# Patient Record
Sex: FEMALE | Race: WHITE | HISPANIC OR LATINO | Employment: UNEMPLOYED | URBAN - METROPOLITAN AREA
[De-identification: names, ages, dates, MRNs, and addresses within clinical notes are randomized per-mention and may not be internally consistent; named-entity substitution may affect disease eponyms.]

---

## 2022-10-13 ENCOUNTER — HOSPITAL ENCOUNTER (EMERGENCY)
Facility: HOSPITAL | Age: 52
Discharge: HOME | End: 2022-10-13
Attending: INTERNAL MEDICINE
Payer: MEDICAID

## 2022-10-13 VITALS
RESPIRATION RATE: 16 BRPM | OXYGEN SATURATION: 100 % | HEIGHT: 66 IN | SYSTOLIC BLOOD PRESSURE: 156 MMHG | WEIGHT: 163.13 LBS | BODY MASS INDEX: 26.22 KG/M2 | DIASTOLIC BLOOD PRESSURE: 80 MMHG | HEART RATE: 80 BPM | TEMPERATURE: 98 F

## 2022-10-13 DIAGNOSIS — M54.12 CERVICAL RADICULOPATHY: Primary | ICD-10-CM

## 2022-10-13 PROCEDURE — 63600175 PHARM REV CODE 636 W HCPCS: Performed by: PHYSICIAN ASSISTANT

## 2022-10-13 PROCEDURE — 96372 THER/PROPH/DIAG INJ SC/IM: CPT | Performed by: PHYSICIAN ASSISTANT

## 2022-10-13 PROCEDURE — 99284 EMERGENCY DEPT VISIT MOD MDM: CPT | Mod: 25

## 2022-10-13 RX ORDER — KETOROLAC TROMETHAMINE 30 MG/ML
30 INJECTION, SOLUTION INTRAMUSCULAR; INTRAVENOUS
Status: COMPLETED | OUTPATIENT
Start: 2022-10-13 | End: 2022-10-13

## 2022-10-13 RX ORDER — METHYLPREDNISOLONE 4 MG/1
TABLET ORAL
Qty: 21 TABLET | Refills: 0 | Status: SHIPPED | OUTPATIENT
Start: 2022-10-13 | End: 2023-01-04 | Stop reason: ALTCHOICE

## 2022-10-13 RX ORDER — METHYLPREDNISOLONE SOD SUCC 125 MG
125 VIAL (EA) INJECTION
Status: COMPLETED | OUTPATIENT
Start: 2022-10-13 | End: 2022-10-13

## 2022-10-13 RX ORDER — BACLOFEN 20 MG/1
20 TABLET ORAL 3 TIMES DAILY PRN
Qty: 15 TABLET | Refills: 0 | Status: SHIPPED | OUTPATIENT
Start: 2022-10-13 | End: 2022-12-12 | Stop reason: SDUPTHER

## 2022-10-13 RX ORDER — INDOMETHACIN 50 MG/1
50 CAPSULE ORAL
Qty: 15 CAPSULE | Refills: 0 | Status: SHIPPED | OUTPATIENT
Start: 2022-10-13 | End: 2022-12-12 | Stop reason: SDUPTHER

## 2022-10-13 RX ADMIN — KETOROLAC TROMETHAMINE 30 MG: 30 INJECTION, SOLUTION INTRAMUSCULAR; INTRAVENOUS at 10:10

## 2022-10-13 RX ADMIN — METHYLPREDNISOLONE SODIUM SUCCINATE 125 MG: 125 INJECTION, POWDER, FOR SOLUTION INTRAMUSCULAR; INTRAVENOUS at 10:10

## 2022-10-13 NOTE — ED PROVIDER NOTES
"Encounter Date: 10/13/2022       History     Chief Complaint   Patient presents with    Arm Pain     PT W CO RT ARM PAIN AND BURNING X 1 WK.  DENIES INJURY BUT PT HAS BEEN PAINTING.       50 yo F presents to ED c/o 1 day hx of pain, paresthesia, numbness & burning in RUE. Reports symptoms feel similar to hitting "funny bone." Patient denies any recent falls or other injury, but does report she has been painting frequently over the past week. Reports taking 400 mg of ibuprofen yesterday at time of symptom onset w/ no resolution of pain. Denies paralysis, decreased ROM, focal weakness, HA, vision changes, neck stiffness, AMS, confusion, vertigo, ataxia, abnormal balance, N/V. VSS on arrival w/ NAD.    Review of patient's allergies indicates:  No Known Allergies  No past medical history on file.  No past surgical history on file.  No family history on file.  Social History     Tobacco Use    Smoking status: Never    Smokeless tobacco: Never     Review of Systems   Constitutional:  Negative for chills, fatigue and fever.   HENT:  Negative for congestion, rhinorrhea and sore throat.    Eyes:  Negative for photophobia and visual disturbance.   Respiratory:  Negative for shortness of breath.    Cardiovascular:  Negative for chest pain and palpitations.   Gastrointestinal:  Negative for abdominal pain, nausea and vomiting.   Endocrine: Negative for polydipsia, polyphagia and polyuria.   Musculoskeletal:  Positive for arthralgias, myalgias and neck pain. Negative for back pain, gait problem, joint swelling and neck stiffness.   Skin:  Negative for color change, pallor, rash and wound.   Neurological:  Positive for numbness (RUE). Negative for dizziness, tremors, seizures, syncope, facial asymmetry, speech difficulty, weakness, light-headedness and headaches.   Hematological:  Does not bruise/bleed easily.   Psychiatric/Behavioral:  Negative for confusion.    All other systems reviewed and are negative.    Physical Exam "     Initial Vitals [10/13/22 0938]   BP Pulse Resp Temp SpO2   (!) 168/81 81 16 97.3 °F (36.3 °C) 100 %      MAP       --         Physical Exam    Nursing note and vitals reviewed.  Constitutional: She appears well-developed and well-nourished. She is not diaphoretic. No distress.   HENT:   Head: Normocephalic and atraumatic.   Mouth/Throat: Oropharynx is clear and moist.   Eyes: Conjunctivae and EOM are normal. Pupils are equal, round, and reactive to light.   Neck: Neck supple. No Brudzinski's sign and no Kernig's sign noted. Carotid bruit is not present. Normal carotid pulses present.   Positive spurling test on R side   Normal range of motion.   Full passive range of motion without pain.     Cardiovascular:  Normal rate, regular rhythm, normal heart sounds and intact distal pulses.     Exam reveals no gallop and no friction rub.       No murmur heard.  Pulmonary/Chest: Breath sounds normal. No respiratory distress. She has no wheezes. She has no rhonchi. She has no rales.   Abdominal: Abdomen is soft. Bowel sounds are normal. She exhibits no distension and no mass. There is no abdominal tenderness. There is no rebound and no guarding.   Musculoskeletal:         General: No tenderness or edema. Normal range of motion.      Cervical back: Full passive range of motion without pain, normal range of motion and neck supple. No rigidity. Muscular tenderness present. No spinous process tenderness. Normal range of motion.     Neurological: She is alert and oriented to person, place, and time. She has normal strength. She is not disoriented. She displays no tremor. No cranial nerve deficit or sensory deficit. She exhibits normal muscle tone. She displays a negative Romberg sign. She displays no seizure activity. Coordination and gait normal. GCS score is 15. GCS eye subscore is 4. GCS verbal subscore is 5. GCS motor subscore is 6.   Skin: Skin is warm and dry. Capillary refill takes less than 2 seconds. No rash noted. No  erythema. No pallor.   Psychiatric: She has a normal mood and affect. Thought content normal.       ED Course   Procedures  Labs Reviewed - No data to display       Imaging Results    None          Medications   ketorolac injection 30 mg (has no administration in time range)   methylPREDNISolone sodium succinate injection 125 mg (has no administration in time range)     Medical Decision Making:   Unremarkable physical exam w/ no deficits. Physical exam consistent w/ cervical radiculopathy. Will given IM toradol & solumedrol in ED & discharge w/ PO NSAID & steroids. Instructed to follow-up w/ PCP. ED precautions needed.                        Clinical Impression:   Final diagnoses:  [M54.12] Cervical radiculopathy (Primary)      ED Disposition Condition    Discharge Stable          ED Prescriptions       Medication Sig Dispense Start Date End Date Auth. Provider    indomethacin (INDOCIN) 50 MG capsule Take 1 capsule (50 mg total) by mouth 3 (three) times daily with meals. 15 capsule 10/13/2022 -- ASHU Bello    methylPREDNISolone (MEDROL DOSEPACK) 4 mg tablet Take all tablets as directed on packaging 21 tablet 10/13/2022 -- ASHU Bello    baclofen (LIORESAL) 20 MG tablet Take 1 tablet (20 mg total) by mouth 3 (three) times daily as needed (muscle spasm). 15 tablet 10/13/2022 10/13/2023 ASHU Bello          Follow-up Information       Follow up With Specialties Details Why Contact Info    PCP  In 1 week      [facility] Emergency Medicine  If symptoms worsen [address] [facility] [id number] [phone]             ASHU Bello  10/13/22 9202

## 2022-12-12 ENCOUNTER — HOSPITAL ENCOUNTER (EMERGENCY)
Facility: HOSPITAL | Age: 52
Discharge: HOME | End: 2022-12-12
Attending: FAMILY MEDICINE
Payer: MEDICAID

## 2022-12-12 VITALS
DIASTOLIC BLOOD PRESSURE: 83 MMHG | BODY MASS INDEX: 27.11 KG/M2 | TEMPERATURE: 98 F | HEART RATE: 87 BPM | RESPIRATION RATE: 18 BRPM | WEIGHT: 162.75 LBS | SYSTOLIC BLOOD PRESSURE: 135 MMHG | HEIGHT: 65 IN | OXYGEN SATURATION: 99 %

## 2022-12-12 DIAGNOSIS — G56.01 RIGHT CARPAL TUNNEL SYNDROME: Primary | ICD-10-CM

## 2022-12-12 PROCEDURE — 63600175 PHARM REV CODE 636 W HCPCS: Performed by: FAMILY MEDICINE

## 2022-12-12 PROCEDURE — 99284 EMERGENCY DEPT VISIT MOD MDM: CPT

## 2022-12-12 PROCEDURE — 96372 THER/PROPH/DIAG INJ SC/IM: CPT | Performed by: FAMILY MEDICINE

## 2022-12-12 RX ORDER — INDOMETHACIN 50 MG/1
50 CAPSULE ORAL
Qty: 15 CAPSULE | Refills: 0 | Status: SHIPPED | OUTPATIENT
Start: 2022-12-12 | End: 2022-12-20 | Stop reason: SINTOL

## 2022-12-12 RX ORDER — METHYLPREDNISOLONE 4 MG/1
TABLET ORAL
Qty: 1 EACH | Refills: 0 | Status: SHIPPED | OUTPATIENT
Start: 2022-12-12 | End: 2022-12-20 | Stop reason: ALTCHOICE

## 2022-12-12 RX ORDER — METHYLPREDNISOLONE SOD SUCC 125 MG
125 VIAL (EA) INJECTION
Status: COMPLETED | OUTPATIENT
Start: 2022-12-12 | End: 2022-12-12

## 2022-12-12 RX ORDER — KETOROLAC TROMETHAMINE 30 MG/ML
60 INJECTION, SOLUTION INTRAMUSCULAR; INTRAVENOUS
Status: COMPLETED | OUTPATIENT
Start: 2022-12-12 | End: 2022-12-12

## 2022-12-12 RX ORDER — BACLOFEN 20 MG/1
20 TABLET ORAL 3 TIMES DAILY PRN
Qty: 15 TABLET | Refills: 0 | Status: SHIPPED | OUTPATIENT
Start: 2022-12-12 | End: 2022-12-20 | Stop reason: SDUPTHER

## 2022-12-12 RX ORDER — METHYLPREDNISOLONE SOD SUCC 125 MG
125 VIAL (EA) INJECTION
Status: DISCONTINUED | OUTPATIENT
Start: 2022-12-12 | End: 2022-12-12

## 2022-12-12 RX ADMIN — KETOROLAC TROMETHAMINE 60 MG: 30 INJECTION, SOLUTION INTRAMUSCULAR; INTRAVENOUS at 04:12

## 2022-12-12 RX ADMIN — METHYLPREDNISOLONE SODIUM SUCCINATE 125 MG: 125 INJECTION, POWDER, FOR SOLUTION INTRAMUSCULAR; INTRAVENOUS at 04:12

## 2022-12-12 NOTE — ED PROVIDER NOTES
"Encounter Date: 12/12/2022       History     Chief Complaint   Patient presents with    Hand Pain     States has been painting "a lot" and is having right hand pain and burning.       52-year-old female presents emergency room complaints of right hand pain.  Symptoms began acutely this evening after painting.  Patient reports having similar symptoms happened to her before in the past.  Patient feels an electrical sensation mainly in the thumb, index, 3rd finger but also little bit in the 4th finger.  She denies any injury, just reports repetitive type movement.  Patient had the same thing happened a few months ago after painting.  Reports symptoms are severe, worse with movement, better with remaining still but still has lot of discomfort with remaining still.  Patient is right-handed.  Describes it as a shocking/burning sensation.    The history is provided by the patient.   Review of patient's allergies indicates:  No Known Allergies  History reviewed. No pertinent past medical history.  Past Surgical History:   Procedure Laterality Date    APPENDECTOMY       History reviewed. No pertinent family history.  Social History     Tobacco Use    Smoking status: Never    Smokeless tobacco: Never   Substance Use Topics    Alcohol use: Yes    Drug use: Not Currently     Review of Systems   Constitutional:  Negative for chills, fatigue and fever.   HENT:  Negative for ear pain, rhinorrhea and sore throat.    Eyes:  Negative for photophobia and pain.   Respiratory:  Negative for cough, shortness of breath and wheezing.    Cardiovascular:  Negative for chest pain.   Gastrointestinal:  Negative for abdominal pain, diarrhea, nausea and vomiting.   Genitourinary:  Negative for dysuria.   Neurological:  Negative for dizziness, weakness and headaches.   All other systems reviewed and are negative.    Physical Exam     Initial Vitals [12/12/22 0301]   BP Pulse Resp Temp SpO2   (!) 157/93 93 17 98.4 °F (36.9 °C) 99 %      MAP     "   --         Physical Exam    Nursing note and vitals reviewed.  Constitutional: She appears well-developed and well-nourished. No distress.   HENT:   Head: Normocephalic and atraumatic.   Eyes: Conjunctivae and EOM are normal. Pupils are equal, round, and reactive to light.   Neck: Neck supple.   Normal range of motion.  Cardiovascular:  Normal rate, regular rhythm, normal heart sounds and intact distal pulses.           Pulmonary/Chest: Breath sounds normal. No respiratory distress. She has no wheezes. She has no rhonchi. She has no rales.   Abdominal: Abdomen is soft. Bowel sounds are normal. She exhibits no distension. There is no abdominal tenderness. There is no rebound and no guarding.   Musculoskeletal:         General: Normal range of motion.      Cervical back: Normal range of motion and neck supple.      Comments: 2+ radial pulse in the right upper extremity.  Full range of motion of the right hand without restriction.  Positive Tinel sign at the wrist.     Neurological: She is alert and oriented to person, place, and time.   Skin: Skin is warm and dry. Capillary refill takes less than 2 seconds. No erythema.   Psychiatric: She has a normal mood and affect. Her behavior is normal. Judgment and thought content normal.       ED Course   Procedures  Labs Reviewed - No data to display       Imaging Results    None          Medications   ketorolac injection 60 mg (60 mg Intramuscular Given 12/12/22 0418)   methylPREDNISolone sodium succinate injection 125 mg (125 mg Intramuscular Given 12/12/22 0417)                              Clinical Impression:   Final diagnoses:  [G56.01] Right carpal tunnel syndrome (Primary)        ED Disposition Condition    Discharge Stable          ED Prescriptions       Medication Sig Dispense Start Date End Date Auth. Provider    methylPREDNISolone (MEDROL DOSEPACK) 4 mg tablet Take as directed on bharathi 1 each 12/12/2022 -- Dillon Dillon MD    indomethacin (INDOCIN) 50 MG  capsule Take 1 capsule (50 mg total) by mouth 3 (three) times daily with meals. 15 capsule 12/12/2022 -- Dillon Dillon MD    baclofen (LIORESAL) 20 MG tablet Take 1 tablet (20 mg total) by mouth 3 (three) times daily as needed (muscle spasm). 15 tablet 12/12/2022 12/12/2023 Dillon Dillon MD          Follow-up Information       Follow up With Specialties Details Why Contact Info    [facility] Emergency Medicine  As needed, If symptoms worsen [address] [facility] [id number] [phone]             Dillon Dillon MD  12/12/22 3844

## 2022-12-14 ENCOUNTER — HOSPITAL ENCOUNTER (EMERGENCY)
Facility: HOSPITAL | Age: 52
Discharge: HOME | End: 2022-12-14
Attending: STUDENT IN AN ORGANIZED HEALTH CARE EDUCATION/TRAINING PROGRAM
Payer: MEDICAID

## 2022-12-14 VITALS
RESPIRATION RATE: 18 BRPM | BODY MASS INDEX: 26.47 KG/M2 | DIASTOLIC BLOOD PRESSURE: 97 MMHG | WEIGHT: 164.69 LBS | SYSTOLIC BLOOD PRESSURE: 148 MMHG | OXYGEN SATURATION: 98 % | HEART RATE: 92 BPM | TEMPERATURE: 98 F | HEIGHT: 66 IN

## 2022-12-14 DIAGNOSIS — J18.9 COMMUNITY ACQUIRED PNEUMONIA, UNSPECIFIED LATERALITY: Primary | ICD-10-CM

## 2022-12-14 DIAGNOSIS — J18.9 PNEUMONIA OF RIGHT UPPER LOBE DUE TO INFECTIOUS ORGANISM: ICD-10-CM

## 2022-12-14 LAB
ABS NEUT CALC (OHS): 24.12 X10(3)/MCL (ref 2.1–9.2)
ALBUMIN SERPL-MCNC: 3.1 G/DL (ref 3.5–5)
ALBUMIN/GLOB SERPL: 0.8 RATIO (ref 1.1–2)
ALP SERPL-CCNC: 130 UNIT/L (ref 40–150)
ALT SERPL-CCNC: 57 UNIT/L (ref 0–55)
AST SERPL-CCNC: 45 UNIT/L (ref 5–34)
BILIRUBIN DIRECT+TOT PNL SERPL-MCNC: 0.5 MG/DL
BUN SERPL-MCNC: 12.5 MG/DL (ref 9.8–20.1)
CALCIUM SERPL-MCNC: 9.3 MG/DL (ref 8.4–10.2)
CHLORIDE SERPL-SCNC: 104 MMOL/L (ref 98–107)
CO2 SERPL-SCNC: 24 MMOL/L (ref 22–29)
CREAT SERPL-MCNC: 0.72 MG/DL (ref 0.55–1.02)
DOHLE BOD BLD QL SMEAR: SLIGHT
ERYTHROCYTE [DISTWIDTH] IN BLOOD BY AUTOMATED COUNT: 12.8 % (ref 11–14.5)
FLUAV AG UPPER RESP QL IA.RAPID: NOT DETECTED
FLUBV AG UPPER RESP QL IA.RAPID: NOT DETECTED
GFR SERPLBLD CREATININE-BSD FMLA CKD-EPI: >60 MLS/MIN/1.73/M2
GLOBULIN SER-MCNC: 4.1 GM/DL (ref 2.4–3.5)
GLUCOSE SERPL-MCNC: 195 MG/DL (ref 74–100)
HCT VFR BLD AUTO: 37.9 % (ref 37–47)
HGB BLD-MCNC: 12.8 GM/DL (ref 12–16)
IMM GRANULOCYTES # BLD AUTO: 1.74 X10(3)/MCL (ref 0–0.04)
IMM GRANULOCYTES NFR BLD AUTO: 6.5 %
LACTATE SERPL-SCNC: 1.2 MMOL/L (ref 0.5–2.2)
LYMPHOCYTES NFR BLD MANUAL: 1.61 X10(3)/MCL
LYMPHOCYTES NFR BLD MANUAL: 6 % (ref 13–40)
MCH RBC QN AUTO: 32.5 PG
MCHC RBC AUTO-ENTMCNC: 33.8 MG/DL (ref 33–36)
MCV RBC AUTO: 96.2 FL (ref 80–94)
METAMYELOCYTES NFR BLD MANUAL: 2 %
MONOCYTES NFR BLD MANUAL: 1.07 X10(3)/MCL (ref 0.1–1.3)
MONOCYTES NFR BLD MANUAL: 4 % (ref 2–11)
NEUTROPHILS NFR BLD MANUAL: 83 % (ref 47–80)
NEUTS BAND NFR BLD MANUAL: 5 % (ref 0–11)
NRBC BLD AUTO-RTO: 0 % (ref 0–1)
PLATELET # BLD AUTO: 263 X10(3)/MCL (ref 140–371)
PLATELET # BLD EST: NORMAL 10*3/UL
PMV BLD AUTO: 9.3 FL (ref 9.4–12.4)
POTASSIUM SERPL-SCNC: 3.2 MMOL/L (ref 3.5–5.1)
PROT SERPL-MCNC: 7.2 GM/DL (ref 6.4–8.3)
RBC # BLD AUTO: 3.94 X10(6)/MCL (ref 4.2–5.4)
RBC MORPH BLD: NORMAL
SARS-COV-2 RNA RESP QL NAA+PROBE: NOT DETECTED
SODIUM SERPL-SCNC: 138 MMOL/L (ref 136–145)
STREP A PCR (OHS): NOT DETECTED
WBC # SPEC AUTO: 26.8 X10(3)/MCL (ref 4.5–11.5)
WBC VACUOLES (OHS): SLIGHT

## 2022-12-14 PROCEDURE — 87651 STREP A DNA AMP PROBE: CPT | Performed by: PHYSICIAN ASSISTANT

## 2022-12-14 PROCEDURE — 0240U COVID/FLU A&B PCR: CPT | Performed by: PHYSICIAN ASSISTANT

## 2022-12-14 PROCEDURE — 85027 COMPLETE CBC AUTOMATED: CPT | Performed by: PHYSICIAN ASSISTANT

## 2022-12-14 PROCEDURE — 80053 COMPREHEN METABOLIC PANEL: CPT | Performed by: PHYSICIAN ASSISTANT

## 2022-12-14 PROCEDURE — 25000003 PHARM REV CODE 250: Performed by: PHYSICIAN ASSISTANT

## 2022-12-14 PROCEDURE — 87040 BLOOD CULTURE FOR BACTERIA: CPT | Performed by: PHYSICIAN ASSISTANT

## 2022-12-14 PROCEDURE — 63600175 PHARM REV CODE 636 W HCPCS: Performed by: PHYSICIAN ASSISTANT

## 2022-12-14 PROCEDURE — 36415 COLL VENOUS BLD VENIPUNCTURE: CPT | Performed by: PHYSICIAN ASSISTANT

## 2022-12-14 PROCEDURE — 96365 THER/PROPH/DIAG IV INF INIT: CPT

## 2022-12-14 PROCEDURE — 83605 ASSAY OF LACTIC ACID: CPT | Performed by: PHYSICIAN ASSISTANT

## 2022-12-14 PROCEDURE — 96367 TX/PROPH/DG ADDL SEQ IV INF: CPT

## 2022-12-14 PROCEDURE — 99284 EMERGENCY DEPT VISIT MOD MDM: CPT | Mod: 25

## 2022-12-14 RX ORDER — SODIUM CHLORIDE 9 MG/ML
1000 INJECTION, SOLUTION INTRAVENOUS
Status: COMPLETED | OUTPATIENT
Start: 2022-12-14 | End: 2022-12-14

## 2022-12-14 RX ORDER — LEVOFLOXACIN 500 MG/1
500 TABLET, FILM COATED ORAL DAILY
Qty: 7 TABLET | Refills: 0 | Status: SHIPPED | OUTPATIENT
Start: 2022-12-14 | End: 2023-01-04 | Stop reason: ALTCHOICE

## 2022-12-14 RX ORDER — ACETAMINOPHEN 500 MG
1000 TABLET ORAL
Status: COMPLETED | OUTPATIENT
Start: 2022-12-14 | End: 2022-12-14

## 2022-12-14 RX ORDER — PROMETHAZINE HYDROCHLORIDE AND DEXTROMETHORPHAN HYDROBROMIDE 6.25; 15 MG/5ML; MG/5ML
5 SYRUP ORAL EVERY 6 HOURS PRN
Qty: 120 ML | Refills: 0 | Status: SHIPPED | OUTPATIENT
Start: 2022-12-14 | End: 2023-01-04 | Stop reason: ALTCHOICE

## 2022-12-14 RX ADMIN — SODIUM CHLORIDE 1 G: 900 INJECTION INTRAVENOUS at 07:12

## 2022-12-14 RX ADMIN — SODIUM CHLORIDE 1000 ML: 9 INJECTION, SOLUTION INTRAVENOUS at 07:12

## 2022-12-14 RX ADMIN — ACETAMINOPHEN 1000 MG: 500 TABLET ORAL at 07:12

## 2022-12-14 RX ADMIN — AZITHROMYCIN MONOHYDRATE 500 MG: 500 INJECTION, POWDER, LYOPHILIZED, FOR SOLUTION INTRAVENOUS at 08:12

## 2022-12-14 NOTE — ED PROVIDER NOTES
Encounter Date: 12/14/2022     History     Chief Complaint   Patient presents with    Cough    Chest Congestion     Cough and congestion x 3 days with body aches, fever at home, Tylenol 3 hrs pta, pt is currently on steroid pack for carpal tunnel     Patient with pmhx of asthma presents today c/o subj fever, sinus congestion, productive cough, headache, and bodyaches that started yesterday. No exacerbating or relieving factors. Patient denies any sick contacts. She reports receiving primary series of covid vaccine a few months ago. She has not had a flu vaccine yet.     The history is provided by the patient. No  was used.   Review of patient's allergies indicates:  No Known Allergies  No past medical history on file.  Past Surgical History:   Procedure Laterality Date    APPENDECTOMY       No family history on file.  Social History     Tobacco Use    Smoking status: Never    Smokeless tobacco: Never   Substance Use Topics    Alcohol use: Yes    Drug use: Not Currently     Review of Systems   Constitutional:  Positive for fever. Negative for chills.   HENT:  Positive for congestion. Negative for postnasal drip, rhinorrhea, sinus pressure, sinus pain, sneezing and sore throat.    Respiratory:  Positive for cough. Negative for shortness of breath.    Cardiovascular:  Negative for chest pain, palpitations and leg swelling.   Gastrointestinal:  Negative for abdominal pain, constipation, diarrhea, nausea and vomiting.   Genitourinary:  Negative for dysuria, flank pain and hematuria.   Musculoskeletal:  Positive for arthralgias and myalgias.   Skin:  Negative for rash.   Neurological:  Positive for headaches. Negative for syncope and light-headedness.   All other systems reviewed and are negative.    Physical Exam     Initial Vitals [12/14/22 1630]   BP Pulse Resp Temp SpO2   (!) 173/105 102 18 99 °F (37.2 °C) 97 %      MAP       --         Physical Exam    Nursing note and vitals  reviewed.  Constitutional: She appears well-developed and well-nourished. She is not diaphoretic. No distress.   HENT:   Head: Normocephalic and atraumatic.   Right Ear: External ear normal.   Left Ear: External ear normal.   Mouth/Throat: Oropharynx is clear and moist. No oropharyngeal exudate.   Eyes: Conjunctivae and EOM are normal.   Neck: Neck supple.   Normal range of motion.  Cardiovascular:  Normal rate, regular rhythm, normal heart sounds and intact distal pulses.           Pulmonary/Chest: Breath sounds normal. No respiratory distress.   Abdominal: Abdomen is soft. Bowel sounds are normal. She exhibits no distension. There is no abdominal tenderness. There is no rebound and no guarding.   Musculoskeletal:         General: No edema.      Cervical back: Normal range of motion and neck supple.     Neurological: She is alert and oriented to person, place, and time. GCS score is 15. GCS eye subscore is 4. GCS verbal subscore is 5. GCS motor subscore is 6.   Skin: Skin is warm and dry. Capillary refill takes less than 2 seconds. No rash noted.   Psychiatric: She has a normal mood and affect.       ED Course   Procedures  Labs Reviewed   COMPREHENSIVE METABOLIC PANEL - Abnormal; Notable for the following components:       Result Value    Potassium Level 3.2 (*)     Glucose Level 195 (*)     Albumin Level 3.1 (*)     Globulin 4.1 (*)     Albumin/Globulin Ratio 0.8 (*)     Alanine Aminotransferase 57 (*)     Aspartate Aminotransferase 45 (*)     All other components within normal limits   CBC WITH DIFFERENTIAL - Abnormal; Notable for the following components:    WBC 26.8 (*)     RBC 3.94 (*)     MCV 96.2 (*)     MPV 9.3 (*)     IG# 1.74 (*)     All other components within normal limits   MANUAL DIFFERENTIAL - Abnormal; Notable for the following components:    Neut Man 83 (*)     Lymph Man 6 (*)     Abs Neut calc 24.12 (*)     Dohle Bodies Slight (*)     WBC Vacuoles Slight (*)     All other components within normal  limits   BLOOD CULTURE OLG - Normal   BLOOD CULTURE OLG - Normal   COVID/FLU A&B PCR - Normal    Narrative:     The Xpert Xpress SARS-CoV-2/FLU/RSV plus is a rapid, multiplexed real-time PCR test intended for the simultaneous qualitative detection and differentiation of SARS-CoV-2, Influenza A, Influenza B, and respiratory syncytial virus (RSV) viral RNA in either nasopharyngeal swab or nasal swab specimens.         STREP GROUP A BY PCR - Normal    Narrative:     The Xpert Xpress Strep A test is a rapid, qualitative in vitro diagnostic test for the detection of Streptococcus pyogenes (Group A ß-hemolytic Streptococcus, Strep A) in throat swab specimens from patients with signs and symptoms of pharyngitis.     LACTIC ACID, PLASMA - Normal   CBC W/ AUTO DIFFERENTIAL    Narrative:     The following orders were created for panel order CBC auto differential.  Procedure                               Abnormality         Status                     ---------                               -----------         ------                     CBC with Differential[[id number]]        Abnormal            Final result               Manual Differential[[id number]]          Abnormal            Final result                 Please view results for these tests on the individual orders.   EXTRA TUBES    Narrative:     The following orders were created for panel order EXTRA TUBES.  Procedure                               Abnormality         Status                     ---------                               -----------         ------                     Light Blue Top Hold[[id number]]                              In process                 Red Top Hold[[id number]]                                     In process                   Please view results for these tests on the individual orders.   LIGHT BLUE TOP HOLD   RED TOP HOLD          Imaging Results              X-Ray Chest PA And Lateral (Final result)  Result time 12/14/22 18:23:24      Final  result by Rick Robison MD (12/14/22 18:23:24)                   Impression:      Areas of right upper lobe and right lung base consolidation.  This is likely pneumonia.  Follow-up radiographs recommended to confirm resolution.      Electronically signed by: Rick Robison  Date:    12/14/2022  Time:    18:23               Narrative:    EXAMINATION:  XR CHEST PA AND LATERAL    CLINICAL HISTORY:  cough;    TECHNIQUE:  PA and lateral radiographs of the chest    COMPARISON:  None    FINDINGS:  Right upper lobe consolidation measuring about 7 cm.  Smaller area of consolidation in the right lung base.  No significant pleural fluid.  No pneumothorax identified.  Normal cardiac silhouette.                                       Medications   0.9%  NaCl infusion (1,000 mLs Intravenous New Bag 12/14/22 1928)   cefTRIAXone (ROCEPHIN) 1 g in sodium chloride 0.9 % 50 mL IVPB (MB+) (0 g Intravenous Stopped 12/14/22 1959)   azithromycin (ZITHROMAX) 500 mg in sodium chloride 0.9% 250 mL IVPB (0 mg Intravenous Stopped 12/14/22 2101)   acetaminophen tablet 1,000 mg (1,000 mg Oral Given 12/14/22 1933)           APC / Resident Notes:   I was not physically present during the history or exam of this patient. I was available at all times for consultation. (Giulia)      ED Course as of 12/16/22 1530   Wed Dec 14, 2022   1751 Care transitioned to Bon Hodge PA-C at 19:00. [SA]   2040 VSS, NAD, pt is non-toxic or ill appearing, labs and imaging reviewed with pt, she does not meet sepsis criteria at this time, Curb 65 score is 0, will treat her as outpatient, pt in agreement with treatment plan, case discussed with Dr. Platt, treatment plan and discharge instructions including follow up discussed, pt verbalized understanding, all questions answered, pt is stable and ready for discharge  [TT]      ED Course User Index  [SA] ASHU Pedro  [TT] ASHU Ashraf                 Clinical Impression:   Final diagnoses:  [J18.9]  Community acquired pneumonia, unspecified laterality (Primary)  [J18.9] Pneumonia of right upper lobe due to infectious organism        ED Disposition Condition    Discharge Stable          ED Prescriptions       Medication Sig Dispense Start Date End Date Auth. Provider    levoFLOXacin (LEVAQUIN) 500 MG tablet Take 1 tablet (500 mg total) by mouth once daily. for 7 days 7 tablet 12/14/2022 12/21/2022 ASHU Ashraf    promethazine-dextromethorphan (PROMETHAZINE-DM) 6.25-15 mg/5 mL Syrp Take 5 mLs by mouth every 6 (six) hours as needed. 120 mL 12/14/2022 12/24/2022 ASHU Ashraf          Follow-up Information       Follow up With Specialties Details Why Contact Info    [facility] Emergency Medicine In 3 days As needed, If symptoms worsen [address] [facility] [id number] [phone]    [facility] for PCP, they will call you with an appointment [address] [facility] [phone]             ASHU Ashraf  12/14/22 2056       Carlito Platt MD  12/16/22 1531

## 2022-12-15 NOTE — DISCHARGE INSTRUCTIONS
You will need to have a repeat chest xray done in 6 weeks to make sure pneumonia has resolved.     Take all medications as prescribed.     Drink plenty of fluids and get a lot of rest.     Return to ER for any changes or worsening of symptoms.       It is important that you follow up with your primary care provider or specialist if indicated for further evaluation, workup, and treatment as necessary. The exam and treatment you received in Emergency Department was for an urgent problem and NOT INTENDED AS COMPLETE CARE. It is important that you FOLLOW UP with a doctor for ongoing care. If your symptoms become WORSE or you DO NOT IMPROVE and you are unable to reach your health care provider, you should RETURN to the Emergency Department. The Emergency Department provider has provided a PRELIMINARY INTERPRETATION of all your studies. A final interpretation may be done after you are discharged. If a change in your diagnosis or treatment is needed WE WILL CONTACT YOU. It is critical that we have a CURRENT PHONE NUMBER FOR YOU.

## 2022-12-19 DIAGNOSIS — Z13.220 SCREENING FOR LIPID DISORDERS: Primary | ICD-10-CM

## 2022-12-19 DIAGNOSIS — Z11.3 SCREEN FOR STD (SEXUALLY TRANSMITTED DISEASE): ICD-10-CM

## 2022-12-19 DIAGNOSIS — Z00.00 ENCOUNTER FOR WELLNESS EXAMINATION IN ADULT: ICD-10-CM

## 2022-12-19 DIAGNOSIS — Z13.21 ENCOUNTER FOR VITAMIN DEFICIENCY SCREENING: ICD-10-CM

## 2022-12-19 DIAGNOSIS — Z13.1 SCREENING FOR DIABETES MELLITUS (DM): ICD-10-CM

## 2022-12-19 DIAGNOSIS — Z13.29 SCREENING FOR THYROID DISORDER: ICD-10-CM

## 2022-12-20 ENCOUNTER — OFFICE VISIT (OUTPATIENT)
Dept: INTERNAL MEDICINE | Facility: CLINIC | Age: 52
End: 2022-12-20
Payer: MEDICAID

## 2022-12-20 VITALS
HEART RATE: 85 BPM | TEMPERATURE: 98 F | OXYGEN SATURATION: 95 % | HEIGHT: 67 IN | WEIGHT: 165.81 LBS | BODY MASS INDEX: 26.02 KG/M2 | SYSTOLIC BLOOD PRESSURE: 129 MMHG | RESPIRATION RATE: 20 BRPM | DIASTOLIC BLOOD PRESSURE: 87 MMHG

## 2022-12-20 DIAGNOSIS — E87.6 HYPOKALEMIA: ICD-10-CM

## 2022-12-20 DIAGNOSIS — Z13.31 POSITIVE DEPRESSION SCREENING: ICD-10-CM

## 2022-12-20 DIAGNOSIS — Z12.11 SCREENING FOR MALIGNANT NEOPLASM OF COLON: ICD-10-CM

## 2022-12-20 DIAGNOSIS — Z12.4 CERVICAL CANCER SCREENING: ICD-10-CM

## 2022-12-20 DIAGNOSIS — R20.2 RIGHT HAND PARESTHESIA: Primary | ICD-10-CM

## 2022-12-20 DIAGNOSIS — R79.89 ABNORMAL CBC: ICD-10-CM

## 2022-12-20 DIAGNOSIS — Z12.31 BREAST CANCER SCREENING BY MAMMOGRAM: ICD-10-CM

## 2022-12-20 DIAGNOSIS — J18.9 COMMUNITY ACQUIRED PNEUMONIA, UNSPECIFIED LATERALITY: ICD-10-CM

## 2022-12-20 LAB
ALBUMIN SERPL-MCNC: 3.1 G/DL (ref 3.5–5)
ALBUMIN/GLOB SERPL: 0.6 RATIO (ref 1.1–2)
ALP SERPL-CCNC: 117 UNIT/L (ref 40–150)
ALT SERPL-CCNC: 34 UNIT/L (ref 0–55)
AST SERPL-CCNC: 23 UNIT/L (ref 5–34)
BACTERIA BLD CULT: NORMAL
BACTERIA BLD CULT: NORMAL
BILIRUBIN DIRECT+TOT PNL SERPL-MCNC: 0.3 MG/DL
BUN SERPL-MCNC: 12.3 MG/DL (ref 9.8–20.1)
CALCIUM SERPL-MCNC: 9.6 MG/DL (ref 8.4–10.2)
CHLORIDE SERPL-SCNC: 102 MMOL/L (ref 98–107)
CO2 SERPL-SCNC: 27 MMOL/L (ref 22–29)
CREAT SERPL-MCNC: 0.7 MG/DL (ref 0.55–1.02)
GFR SERPLBLD CREATININE-BSD FMLA CKD-EPI: >60 MLS/MIN/1.73/M2
GLOBULIN SER-MCNC: 5 GM/DL (ref 2.4–3.5)
GLUCOSE SERPL-MCNC: 91 MG/DL (ref 74–100)
MAGNESIUM SERPL-MCNC: 2.1 MG/DL (ref 1.6–2.6)
POTASSIUM SERPL-SCNC: 4 MMOL/L (ref 3.5–5.1)
PROT SERPL-MCNC: 8.1 GM/DL (ref 6.4–8.3)
SODIUM SERPL-SCNC: 140 MMOL/L (ref 136–145)

## 2022-12-20 PROCEDURE — 99214 OFFICE O/P EST MOD 30 MIN: CPT | Mod: 25,S$PBB,,

## 2022-12-20 PROCEDURE — 99214 PR OFFICE/OUTPT VISIT, EST, LEVL IV, 30-39 MIN: ICD-10-PCS | Mod: 25,S$PBB,,

## 2022-12-20 PROCEDURE — 80053 COMPREHEN METABOLIC PANEL: CPT

## 2022-12-20 PROCEDURE — 99396 PREV VISIT EST AGE 40-64: CPT | Mod: S$PBB,,,

## 2022-12-20 PROCEDURE — 83735 ASSAY OF MAGNESIUM: CPT

## 2022-12-20 PROCEDURE — 99396 PR PREVENTIVE VISIT,EST,40-64: ICD-10-PCS | Mod: S$PBB,,,

## 2022-12-20 PROCEDURE — 99215 OFFICE O/P EST HI 40 MIN: CPT | Mod: PBBFAC

## 2022-12-20 PROCEDURE — 36415 COLL VENOUS BLD VENIPUNCTURE: CPT

## 2022-12-20 RX ORDER — IBUPROFEN 800 MG/1
800 TABLET ORAL 3 TIMES DAILY PRN
Qty: 70 TABLET | Refills: 1 | OUTPATIENT
Start: 2022-12-20 | End: 2023-02-04

## 2022-12-20 RX ORDER — BACLOFEN 20 MG/1
20 TABLET ORAL 3 TIMES DAILY PRN
Qty: 60 TABLET | Refills: 1 | Status: SHIPPED | OUTPATIENT
Start: 2022-12-20 | End: 2023-01-04 | Stop reason: SDUPTHER

## 2022-12-20 NOTE — ASSESSMENT & PLAN NOTE
"Referral to Dr. Maria Luisa Mensah (neurology) for EMG.  Will refer to ortho once EMG results received.  Keep appt when scheduled.  Continue wrist splints at night.  Apply ice 2-3 times a day for 20 minutes at a time while a towel between the skin and ice pack.  Continue baclofen - refilled today.  Rx ibuprofen 800 mg.  D/c indomethacin - "too strong."    "

## 2022-12-20 NOTE — PROGRESS NOTES
Subjective:       Patient ID: Lidia Aguirre is a 52 y.o. female.    Chief Complaint: Establish Care, Referral, and Medication Refill    HPI  Lidia Aguirre is a 52 y.o. White female presenting in clinic today to Establish Care, Referral, and Medication Refill. Previous PCP: Dr. Pascual Sommers. PMH: CTS, asthma and seasonal allergies.     She is c/o numbness and tingling in right hand x1-1/2 months ago. She says it is a burning pain. She says she was painting at home and it exacerbated the systems. She had a visit to the ED at [facility] 12/12/2022 for the complaint. She was prescribed indomethacin and baclofen. She says the indomethacin was too strong. She voices some relief with baclofen, ibuprofen, and tylenol arthritis. She was referred to [facility] orthopedic clinic by the ED. She says she wears a brace on her right wrist at night.    PHQ9-8 and GAD7-7. She is now laid off and is living with her mother. She denies SI/HI.     All pertinent labs dated 12/14/2022 and diagnotic tests reviewed and discussed with patient. WBC-26.8, but blood cultures were negative. CBC shows Dohle bodies and vacuolated granulocytes.     Denies smoking. Drinks wine occasionally. Denies illicit drug use. Denies chest pain, shortness of breath, cough, headache, dizziness, weakness, abdominal pain, nausea, vomiting, diarrhea, constipation, dysuria, depression, anxiety, SI, and HI.    Cervical Cancer Screening - Last Pap on approximately a year ago with Dr. Cartwright . Referral to [facility] GYN (12/20/2022).  Breast Cancer Screening - Last Mammogram approximately 1 year. Follow up annually.  Colon Cancer Screening - FIT kit ordered (12/20/2022).  Osteoporosis Screening - Deferred due to age.   Eye Exam - Several years. List of local eye doctors provided to patient.  Dental Exam - Several years. List of local dentists provided to patient.  Vaccinations: Flu - Declines  / Pneumococcal - Declines / Shingles - Declines / Tetanus - Declines     Review of  Systems   Constitutional: Negative.    HENT: Negative.     Eyes: Negative.    Respiratory:  Positive for cough. Negative for shortness of breath and wheezing.    Cardiovascular: Negative.    Gastrointestinal: Negative.    Endocrine: Negative.    Genitourinary: Negative.    Musculoskeletal: Negative.    Integumentary:  Negative.   Allergic/Immunologic: Negative.    Neurological: Negative.    Hematological: Negative.    Psychiatric/Behavioral: Negative.  Negative for dysphoric mood and sleep disturbance. The patient is not nervous/anxious.    All other systems reviewed and are negative.      Objective:      Physical Exam  Vitals reviewed.   Constitutional:       Appearance: Normal appearance. She is normal weight.   HENT:      Head: Normocephalic and atraumatic.      Right Ear: External ear normal.      Left Ear: External ear normal.      Nose: Nose normal.      Mouth/Throat:      Mouth: Mucous membranes are moist.      Pharynx: Oropharynx is clear.   Eyes:      Extraocular Movements: Extraocular movements intact.      Conjunctiva/sclera: Conjunctivae normal.      Pupils: Pupils are equal, round, and reactive to light.   Cardiovascular:      Rate and Rhythm: Normal rate and regular rhythm.      Pulses: Normal pulses.      Heart sounds: Normal heart sounds.   Pulmonary:      Effort: Pulmonary effort is normal. No respiratory distress.      Breath sounds: Normal breath sounds. No wheezing.   Abdominal:      General: Bowel sounds are normal.      Palpations: Abdomen is soft.   Musculoskeletal:         General: Normal range of motion.      Cervical back: Normal range of motion and neck supple.   Skin:     General: Skin is warm and dry.      Capillary Refill: Capillary refill takes less than 2 seconds.   Neurological:      General: No focal deficit present.      Mental Status: She is alert and oriented to person, place, and time.   Psychiatric:         Attention and Perception: Attention and perception normal.         Mood  "and Affect: Mood normal.         Speech: Speech normal.         Behavior: Behavior normal.         Thought Content: Thought content normal.         Cognition and Memory: Cognition and memory normal.         Judgment: Judgment normal.       Assessment and Plan:       Problem List Items Addressed This Visit          Pulmonary    Community acquired pneumonia     Continue levofloxacin and medrol dose pack.  ED precautions.            Renal/    Breast cancer screening by mammogram     MMG ordered.         Relevant Orders    Mammo Digital Screening Bilat w/ Delvin    Cervical cancer screening     Referral to [facility] GYN.          Relevant Orders    Ambulatory referral/consult to Gynecology    Hypokalemia     Repeat CMP and Magnesium today.         Relevant Orders    Comprehensive Metabolic Panel    Magnesium       GI    Screening for malignant neoplasm of colon     FIT kit provided, to return ASAP.  Will refer to GI if indicated.           Relevant Orders    Occult Blood, Fecal Immunoassay       Other    Right hand paresthesia - Primary     Referral to Dr. Maria Luisa Mensah (neurology) for EMG.  Will refer to ortho once EMG results received.  Keep appt when scheduled.  Continue wrist splints at night.  Apply ice 2-3 times a day for 20 minutes at a time while a towel between the skin and ice pack.  Continue baclofen - refilled today.  Rx ibuprofen 800 mg.  D/c indomethacin - "too strong."           Relevant Medications    ibuprofen (ADVIL,MOTRIN) 800 MG tablet    baclofen (LIORESAL) 20 MG tablet    Other Relevant Orders    Ambulatory referral/consult to Neurology    Abnormal CBC     Repeat CBC with peripheral smear.         Relevant Orders    Path Review, Peripheral Smear    Positive depression screening     PHQ9 12/20/2022   Total Score 8      GAD7 12/20/2022   1. Feeling nervous, anxious, or on edge? 0   2. Not being able to stop or control worrying? 1   3. Worrying too much about different things? 1   4. Trouble relaxing? 1   5. " Being so restless that it is hard to sit still? 3   6. Becoming easily annoyed or irritable? 1   7. Feeling afraid as if something awful might happen? 0   8. If you checked off any problems, how difficult have these problems made it for you to do your work, take care of things at home, or get along with other people? 1   MUSTAPHA-7 Score 7   Denies SI/HI.  Read positive daily meditations, avoid negative media, set healthy boundaries.   Exercise daily, keep consistent sleep pattern, eat a healthy diet.   Establish good social support, make changes to reduce stress.   Do not drink alcohol or use illicit drugs.   Reports any symptoms of suicidal/homicidal ideations or self harm immediately, go to nearest emergency room.                Virtual visit in 2 weeks.  RTC prn.  Labs within a week of visit.    CHERYL Resendiz  12/20/22

## 2022-12-20 NOTE — ASSESSMENT & PLAN NOTE
PHQ9 12/20/2022   Total Score 8      GAD7 12/20/2022   1. Feeling nervous, anxious, or on edge? 0   2. Not being able to stop or control worrying? 1   3. Worrying too much about different things? 1   4. Trouble relaxing? 1   5. Being so restless that it is hard to sit still? 3   6. Becoming easily annoyed or irritable? 1   7. Feeling afraid as if something awful might happen? 0   8. If you checked off any problems, how difficult have these problems made it for you to do your work, take care of things at home, or get along with other people? 1   MUSTAPHA-7 Score 7   Denies SI/HI.  Read positive daily meditations, avoid negative media, set healthy boundaries.   Exercise daily, keep consistent sleep pattern, eat a healthy diet.   Establish good social support, make changes to reduce stress.   Do not drink alcohol or use illicit drugs.   Reports any symptoms of suicidal/homicidal ideations or self harm immediately, go to nearest emergency room.

## 2023-01-04 ENCOUNTER — OFFICE VISIT (OUTPATIENT)
Dept: INTERNAL MEDICINE | Facility: CLINIC | Age: 53
End: 2023-01-04
Payer: MEDICAID

## 2023-01-04 DIAGNOSIS — Z00.00 WELL ADULT EXAM: ICD-10-CM

## 2023-01-04 DIAGNOSIS — D75.839 THROMBOCYTOSIS: ICD-10-CM

## 2023-01-04 DIAGNOSIS — R79.89 ABNORMAL CBC: ICD-10-CM

## 2023-01-04 DIAGNOSIS — R20.2 RIGHT HAND PARESTHESIA: ICD-10-CM

## 2023-01-04 DIAGNOSIS — E87.6 HYPOKALEMIA: ICD-10-CM

## 2023-01-04 DIAGNOSIS — E55.9 VITAMIN D DEFICIENCY: Primary | ICD-10-CM

## 2023-01-04 PROBLEM — J18.9 COMMUNITY ACQUIRED PNEUMONIA: Status: RESOLVED | Noted: 2022-12-20 | Resolved: 2023-01-04

## 2023-01-04 PROCEDURE — 99396 PREV VISIT EST AGE 40-64: CPT | Mod: 95,,,

## 2023-01-04 PROCEDURE — 99214 PR OFFICE/OUTPT VISIT, EST, LEVL IV, 30-39 MIN: ICD-10-PCS | Mod: 25,95,,

## 2023-01-04 PROCEDURE — 99214 OFFICE O/P EST MOD 30 MIN: CPT | Mod: 25,95,,

## 2023-01-04 PROCEDURE — 99396 PR PREVENTIVE VISIT,EST,40-64: ICD-10-PCS | Mod: 95,,,

## 2023-01-04 RX ORDER — BACLOFEN 20 MG/1
20 TABLET ORAL 3 TIMES DAILY PRN
Qty: 60 TABLET | Refills: 1 | Status: SHIPPED | OUTPATIENT
Start: 2023-01-04 | End: 2024-01-04

## 2023-01-04 RX ORDER — ASPIRIN 325 MG
50000 TABLET, DELAYED RELEASE (ENTERIC COATED) ORAL
Qty: 12 CAPSULE | Refills: 0 | Status: SHIPPED | OUTPATIENT
Start: 2023-01-04

## 2023-01-04 NOTE — ASSESSMENT & PLAN NOTE
Lab Results   Component Value Date     (H) 12/20/2022     Repeat CBC at next office visit.  Jak2, EPO ordered.

## 2023-01-04 NOTE — ASSESSMENT & PLAN NOTE
Lab Results   Component Value Date    WBC 11.0 12/20/2022    WBC 26.8 (H) 12/14/2022    RBC 4.30 12/20/2022    RBC 3.94 (L) 12/14/2022    HGB 14.0 12/20/2022    HGB 12.8 12/14/2022    HCT 41.4 12/20/2022    HCT 37.9 12/14/2022    MCV 96.3 (H) 12/20/2022    MCV 96.2 (H) 12/14/2022    MCH 32.6 12/20/2022    MCH 32.5 12/14/2022    MCHC 33.8 12/20/2022    MCHC 33.8 12/14/2022    RDW 13.0 12/20/2022    RDW 12.8 12/14/2022     (H) 12/20/2022     12/14/2022    MPV 8.6 (L) 12/20/2022    MPV 9.3 (L) 12/14/2022   Peripheral smear d/c'd per pathologist.  WBC now WNL.

## 2023-01-04 NOTE — ASSESSMENT & PLAN NOTE
Referral pending for Dr. Maria Luisa Mensah.  Will refer to ortho once EMG results received.  Keep appt when scheduled.  Continue wrist splints at night.  Apply ice 2-3 times a day for 20 minutes at a time while a towel between the skin and ice pack.  Continue ibuprofen and baclofen.

## 2023-01-04 NOTE — PATIENT INSTRUCTIONS
REMINDER: Please complete labs within 1 week of appointment.  Please complete satisfaction survey when received. Thank you.

## 2023-01-04 NOTE — ASSESSMENT & PLAN NOTE
Wellness labs - Up to date 1/4/2023.  Cervical Cancer Screening - Last Pap on approximately a year ago with Dr. Cartwright . GYN appt: 12/13/2023.  Breast Cancer Screening - Last Mammogram approximately 1 year. Follow up annually.  Colon Cancer Screening - FIT kit ordered (12/20/2022).  Osteoporosis Screening - Deferred due to age.   Eye Exam - Several years. List of local eye doctors provided to patient.  Dental Exam - Several years. List of local dentists provided to patient.  Vaccinations: Flu - Declines  / Pneumococcal - Declines / Shingles - Declines / Tetanus - Declines

## 2023-01-04 NOTE — ASSESSMENT & PLAN NOTE
Lab Results   Component Value Date    [id number] 17.6 (L) 12/20/2022     Educated on increasing foods high in Vitamin D such as fish oil, cod liver oil, salmon, milk fortified with vitamin D.  RX Vitamin D3 69898 IU weekly x 12 weeks.  Complete entire 12 weeks of Vitamin D prescription.  After completion of prescription (12 weeks/3 months), begin taking Vitamin D 2000 I.U. tablets daily (purchase over the counter).  Repeat Vitamin D level as ordered.

## 2023-01-04 NOTE — PROGRESS NOTES
"VISIT DATE: 23    PATIENT NAME: Lidia Aguirre  : 1970  MRN: [id number]     The patient location is: [place], [place]  The chief complaint leading to consultation is: Lab review    Visit type: audio only    Audio time with patient: 10 minutes  30 minutes of total time spent on the encounter, which includes face to face time and non-face to face time preparing to see the patient (eg, review of tests), Obtaining and/or reviewing separately obtained history, Documenting clinical information in the electronic or other health record, Independently interpreting results (not separately reported) and communicating results to the patient/family/caregiver, or Care coordination (not separately reported).     Each patient to whom he or she provides medical services by telemedicine is:  (1) informed of the relationship between the physician and patient and the respective role of any other health care provider with respect to management of the patient; and (2) notified that he or she may decline to receive medical services by telemedicine and may withdraw from such care at any time.    Reason for visit / Chief Complaint:  F/U Carpel Tunnel  and lab review       History of Present Illness (HPI):  Lidia Aguirre is a 52 y.o. White female presenting virtually by audio only for F/U Carpel Tunnel  and lab review. PMH: CTS, asthma and seasonal allergies.      At last office visit, she is c/o numbness and tingling in right hand x1-1/2 months ago. She had a visit to the ED at [facility] 2022 for the complaint. She was prescribed indomethacin and baclofen. She says the indomethacin was too strong. Indomethacin was discontinued and she was prescribed ibuprofen 800 mg. Today, she states she feels "much better." She was referred for an EMG, but has not received an appt yet. She continues she wears a brace on her right wrist at night.      All pertinent labs dated 2022 and diagnotic tests reviewed and discussed with patient.    "   Denies smoking. Drinks wine occasionally. Denies illicit drug use. Denies chest pain, shortness of breath, cough, headache, dizziness, weakness, abdominal pain, nausea, vomiting, diarrhea, constipation, dysuria, depression, anxiety, SI, and HI.     Cervical Cancer Screening - Last Pap on approximately a year ago with Dr. Cartwright . GYN appt: 2023.  Breast Cancer Screening - Last Mammogram approximately 1 year. Follow up annually.  Colon Cancer Screening - FIT kit ordered (2022).  Osteoporosis Screening - Deferred due to age.   Eye Exam - Several years. List of local eye doctors provided to patient.  Dental Exam - Several years. List of local dentists provided to patient.  Vaccinations: Flu - Declines  / Pneumococcal - Declines / Shingles - Declines / Tetanus - Declines         Review of Systems     Review of Systems   Constitutional: Negative.    HENT: Negative.     Eyes: Negative.    Respiratory: Negative.     Cardiovascular: Negative.    Gastrointestinal: Negative.    Endocrine: Negative.    Genitourinary: Negative.    Musculoskeletal: Negative.    Skin: Negative.    Allergic/Immunologic: Negative.    Neurological: Negative.    Hematological: Negative.    Psychiatric/Behavioral: Negative.     All other systems reviewed and are negative.    Medical / Social / Family History     Past Medical History:   Diagnosis Date    Allergy     Asthma     Carpal tunnel syndrome          Past Surgical History:   Procedure Laterality Date    APPENDECTOMY       SECTION           Social History  Lidia Aguirre's  reports that she has never smoked. She has never used smokeless tobacco. She reports current alcohol use. She reports that she does not currently use drugs.    Family History  Lidia Aguirre's family history includes Depression in her father; Heart disease in her mother; Hypertension in her mother; Lupus in her mother and sister.    Medications and Allergies     Medications  Outpatient Medications  Marked as Taking for the 1/4/23 encounter (Office Visit) with CHERYL Resendiz   Medication Sig Dispense Refill    ibuprofen (ADVIL,MOTRIN) 800 MG tablet Take 1 tablet (800 mg total) by mouth 3 (three) times daily as needed for Pain. 70 tablet 1       Allergies  Review of patient's allergies indicates:  No Known Allergies    Physical Examination   No vitals due to virtual visit.  Physical Exam  Pulmonary:      Effort: Pulmonary effort is normal.   Neurological:      General: No focal deficit present.      Mental Status: She is alert and oriented to person, place, and time.   Psychiatric:         Mood and Affect: Mood normal.         Behavior: Behavior normal.         Thought Content: Thought content normal.         Judgment: Judgment normal.         Results     Lab Results   Component Value Date    WBC 11.0 12/20/2022    RBC 4.30 12/20/2022    HGB 14.0 12/20/2022    HCT 41.4 12/20/2022    MCV 96.3 (H) 12/20/2022    MCH 32.6 12/20/2022    MCHC 33.8 12/20/2022    RDW 13.0 12/20/2022     (H) 12/20/2022    MPV 8.6 (L) 12/20/2022     Lab Results   Component Value Date     12/20/2022    K 4.0 12/20/2022    CO2 27 12/20/2022    BUN 12.3 12/20/2022    CREATININE 0.70 12/20/2022    CALCIUM 9.6 12/20/2022    ALBUMIN 3.1 (L) 12/20/2022    BILITOT 0.3 12/20/2022    ALKPHOS 117 12/20/2022    AST 23 12/20/2022    ALT 34 12/20/2022     Lab Results   Component Value Date    TSH 1.376 12/20/2022     Lab Results   Component Value Date    CHOL 149 12/20/2022    HDL 36 12/20/2022    LDL 81.00 12/20/2022    TRIG 159 (H) 12/20/2022     Lab Results   Component Value Date    COLORUA Yellow 12/20/2022    SGUA 1.018 12/20/2022    PROTEINUA Negative 12/20/2022    GLUCOSEUA Normal 12/20/2022    BILIRUBINUA Negative 12/20/2022    BLOODUA Negative 12/20/2022    RBCUA 0-5 12/20/2022    BACTERIA Trace (A) 12/20/2022    LEUKOCYTESUR Negative 12/20/2022    UROBILINOGEN Normal 12/20/2022      No results found for: MICROALBUR,  [id number]  Lab Results   Component Value Date    [id number] 17.6 (L) 12/20/2022     Lab Results   Component Value Date    HIV Nonreactive 12/20/2022    HEPAIGM Nonreactive 12/20/2022    HEPBCOREM Nonreactive 12/20/2022    HEPCAB Nonreactive 12/20/2022     No results found for: FITDIAG, COLOGUARD  No results found for: MICROALBUR, [id number]        Assessment and Plan (including Health Maintenance)     Health Maintenance Due   Topic Date Due    Cervical Cancer Screening  Never done    COVID-19 Vaccine (1) Never done    Mammogram  Never done    Colorectal Cancer Screening  Never done       Problem List Items Addressed This Visit          Renal/    Hypokalemia    Current Assessment & Plan     Lab Results   Component Value Date    K 4.0 12/20/2022   Resolved.               Oncology    Thrombocytosis    Current Assessment & Plan     Lab Results   Component Value Date     (H) 12/20/2022   Repeat CBC at next office visit.  Jak2, EPO ordered.         Relevant Orders    [id number] Mutation Analysis, Quantitative, With Reflex to JAK2 Exon 12-15 Mutation Analysis    Erythropoietin       Endocrine    Vitamin D deficiency - Primary    Current Assessment & Plan     Lab Results   Component Value Date    TYPLVMTV97IB 17.6 (L) 12/20/2022   Educated on increasing foods high in Vitamin D such as fish oil, cod liver oil, salmon, milk fortified with vitamin D.  RX Vitamin D3 59411 IU weekly x 12 weeks.  Complete entire 12 weeks of Vitamin D prescription.  After completion of prescription (12 weeks/3 months), begin taking Vitamin D 2000 I.U. tablets daily (purchase over the counter).  Repeat Vitamin D level as ordered.          Relevant Medications    cholecalciferol, vitamin D3, 1,250 mcg (50,000 unit) capsule    Other Relevant Orders    Vitamin D       Other    Right hand paresthesia    Current Assessment & Plan     Referral pending for Dr. Maria Luisa Mensah.  Will refer to ortho once EMG results received.  Keep appt when  scheduled.  Continue wrist splints at night.  Apply ice 2-3 times a day for 20 minutes at a time while a towel between the skin and ice pack.  Continue ibuprofen and baclofen.           Relevant Medications    baclofen (LIORESAL) 20 MG tablet    Abnormal CBC    Current Assessment & Plan     Lab Results   Component Value Date    WBC 11.0 12/20/2022    WBC 26.8 (H) 12/14/2022    RBC 4.30 12/20/2022    RBC 3.94 (L) 12/14/2022    HGB 14.0 12/20/2022    HGB 12.8 12/14/2022    HCT 41.4 12/20/2022    HCT 37.9 12/14/2022    MCV 96.3 (H) 12/20/2022    MCV 96.2 (H) 12/14/2022    MCH 32.6 12/20/2022    MCH 32.5 12/14/2022    MCHC 33.8 12/20/2022    MCHC 33.8 12/14/2022    RDW 13.0 12/20/2022    RDW 12.8 12/14/2022     (H) 12/20/2022     12/14/2022    MPV 8.6 (L) 12/20/2022    MPV 9.3 (L) 12/14/2022   Peripheral smear d/c'd per pathologist.  WBC now WNL.         Well adult exam    Current Assessment & Plan     Wellness labs - Up to date 1/4/2023.  Cervical Cancer Screening - Last Pap on approximately a year ago with Dr. Cartwright . GYN appt: 12/13/2023.  Breast Cancer Screening - Last Mammogram approximately 1 year. Follow up annually.  Colon Cancer Screening - FIT kit ordered (12/20/2022).  Osteoporosis Screening - Deferred due to age.   Eye Exam - Several years. List of local eye doctors provided to patient.  Dental Exam - Several years. List of local dentists provided to patient.  Vaccinations: Flu - Declines  / Pneumococcal - Declines / Shingles - Declines / Tetanus - Declines         Relevant Orders    CBC Auto Differential    Comprehensive Metabolic Panel    Microalbumin/Creatinine Ratio, Urine    Urinalysis, Reflex to Urine Culture Urine, Clean Catch       Health Maintenance Topics with due status: Not Due       Topic Last Completion Date    Lipid Panel 12/20/2022       Future Appointments   Date Time Provider Department Center   7/5/2023  9:45 AM [facility]   12/13/2023  12:30 PM Tomasa Brown, ANP [facility]YN [place] [facility]        RTC in 6 month(s)and prn.  Labs within a week of visit.             Signature:  CHERYL Resendiz  [facility]  [address] [id number]    Date of encounter: 1/4/23

## 2023-02-04 ENCOUNTER — HOSPITAL ENCOUNTER (EMERGENCY)
Facility: HOSPITAL | Age: 53
Discharge: HOME | End: 2023-02-04
Attending: INTERNAL MEDICINE
Payer: MEDICAID

## 2023-02-04 VITALS
BODY MASS INDEX: 25.86 KG/M2 | HEART RATE: 81 BPM | TEMPERATURE: 98 F | DIASTOLIC BLOOD PRESSURE: 89 MMHG | OXYGEN SATURATION: 100 % | RESPIRATION RATE: 16 BRPM | SYSTOLIC BLOOD PRESSURE: 159 MMHG | HEIGHT: 66 IN | WEIGHT: 160.94 LBS

## 2023-02-04 DIAGNOSIS — M25.531 RIGHT WRIST PAIN: ICD-10-CM

## 2023-02-04 DIAGNOSIS — R05.9 COUGH: ICD-10-CM

## 2023-02-04 DIAGNOSIS — J06.9 UPPER RESPIRATORY TRACT INFECTION, UNSPECIFIED TYPE: Primary | ICD-10-CM

## 2023-02-04 LAB
B-HCG UR QL: NEGATIVE
CTP QC/QA: YES
FLUAV AG UPPER RESP QL IA.RAPID: NOT DETECTED
FLUBV AG UPPER RESP QL IA.RAPID: NOT DETECTED
SARS-COV-2 RNA RESP QL NAA+PROBE: NOT DETECTED
STREP A PCR (OHS): NOT DETECTED

## 2023-02-04 PROCEDURE — 81025 URINE PREGNANCY TEST: CPT | Performed by: NURSE PRACTITIONER

## 2023-02-04 PROCEDURE — 99284 EMERGENCY DEPT VISIT MOD MDM: CPT | Mod: 25

## 2023-02-04 PROCEDURE — 0240U COVID/FLU A&B PCR: CPT | Performed by: NURSE PRACTITIONER

## 2023-02-04 PROCEDURE — 87651 STREP A DNA AMP PROBE: CPT | Performed by: NURSE PRACTITIONER

## 2023-02-04 RX ORDER — DICLOFENAC SODIUM 75 MG/1
75 TABLET, DELAYED RELEASE ORAL 2 TIMES DAILY PRN
Qty: 20 TABLET | Refills: 0 | Status: SHIPPED | OUTPATIENT
Start: 2023-02-04

## 2023-02-04 NOTE — ED PROVIDER NOTES
"Encounter Date: 2023       History     Chief Complaint   Patient presents with    Nasal Congestion    Cough    Wrist Pain     C/o right carpal tunnel pain, also c/o nonproductive cough with pain in left lung area.      The patient presents with occas nonprod cough, sore throat, nasal congestion, subjective fever, no cp or sob, no known covid-19 exposure.  The onset was 3 days ago.  The course/duration of symptoms is constant.  The degree at present is minimal.  The exacerbating factor is none.  The relieving factor is none.  Risk factors consist of none.  Prior episodes: occasional.  Associated symptoms: denies chest pain and denies shortness of breath.  Additional history: she reports left upper back pain with coughing similar to when she had pneumonia.       The patient presents with right wrist pain. The onset was chronic.  The course/duration of symptoms is constant. Type of injury: none and none known. Location: Right wrist. The character of symptoms is burning pain and numbness.  The degree at present is minimal. There are exacerbating factors including movement.  The relieving factor is rest. Risk factors consist of none. Prior episodes: chronic. Therapy today: none. Associated symptoms: none. Additional history: she has been diagnoses with CTS in the past. She has an orthopedic clinic appointment next week.    Review of patient's allergies indicates:  No Known Allergies  Past Medical History:   Diagnosis Date    Allergy     Asthma     Carpal tunnel syndrome      Past Surgical History:   Procedure Laterality Date    APPENDECTOMY       SECTION       Family History   Problem Relation Age of Onset    Hypertension Mother     Heart disease Mother     Lupus Mother     Depression Father     Lupus Sister      Social History     Tobacco Use    Smoking status: Never    Smokeless tobacco: Never   Substance Use Topics    Alcohol use: Yes     Comment: occl"    Drug use: Not Currently     Review of Systems "   Constitutional:  Positive for fever.   HENT:  Positive for congestion and sore throat.    Respiratory:  Positive for cough. Negative for shortness of breath.    Cardiovascular:  Negative for chest pain.   Gastrointestinal:  Negative for nausea.   Genitourinary:  Negative for dysuria.   Musculoskeletal:  Positive for arthralgias. Negative for back pain.   Skin:  Negative for rash.   Neurological:  Negative for weakness.   Hematological:  Does not bruise/bleed easily.   All other systems reviewed and are negative.    Physical Exam     Initial Vitals [02/04/23 1035]   BP Pulse Resp Temp SpO2   (!) 179/96 87 20 97.9 °F (36.6 °C) 98 %      MAP       --         Physical Exam    Nursing note and vitals reviewed.  Constitutional: She appears well-developed and well-nourished.   HENT:   Head: Normocephalic and atraumatic.   Right Ear: Tympanic membrane normal.   Left Ear: Tympanic membrane normal.   Nose: Nose normal.   Mouth/Throat: Uvula is midline, oropharynx is clear and moist and mucous membranes are normal.   Neck: Neck supple.   Normal range of motion.  Cardiovascular:  Normal rate, regular rhythm, normal heart sounds and intact distal pulses.           Pulmonary/Chest: Breath sounds normal.   Abdominal: Abdomen is soft. Bowel sounds are normal.   Musculoskeletal:         General: Normal range of motion.      Cervical back: Normal range of motion and neck supple.      Comments: Mild ttp right wrist, FROM, good distal pulses, NVI     Neurological: She is alert. She has normal strength.   Skin: Skin is warm and dry.   Psychiatric: She has a normal mood and affect.       ED Course   Procedures  Labs Reviewed   STREP GROUP A BY PCR - Normal    Narrative:     The Xpert Xpress Strep A test is a rapid, qualitative in vitro diagnostic test for the detection of Streptococcus pyogenes (Group A ß-hemolytic Streptococcus, Strep A) in throat swab specimens from patients with signs and symptoms of pharyngitis.     COVID/FLU A&B  PCR - Normal    Narrative:     The Xpert Xpress SARS-CoV-2/FLU/RSV plus is a rapid, multiplexed real-time PCR test intended for the simultaneous qualitative detection and differentiation of SARS-CoV-2, Influenza A, Influenza B, and respiratory syncytial virus (RSV) viral RNA in either nasopharyngeal swab or nasal swab specimens.         POCT URINE PREGNANCY          Imaging Results              X-Ray Wrist Complete Right (Final result)  Result time 02/04/23 13:05:27      Final result by John Barrera MD (02/04/23 13:05:27)                   Impression:      No acute findings.      Electronically signed by: John Barrera  Date:    02/04/2023  Time:    13:05               Narrative:    EXAMINATION:  XR WRIST COMPLETE 3 VIEWS RIGHT    CLINICAL HISTORY:  Pain in right wrist    COMPARISON:  None    FINDINGS:  Three views of the right wrist demonstrate no fracture or dislocation.  No significant degenerative change.                                       X-Ray Chest AP Portable (Final result)  Result time 02/04/23 12:42:02      Final result by Dami Rangel MD (02/04/23 12:42:02)                   Impression:      NO ACUTE CARDIOPULMONARY PROCESS IDENTIFIED.      Electronically signed by: Dami Rangel  Date:    02/04/2023  Time:    12:42               Narrative:    EXAMINATION:  XR CHEST AP PORTABLE    CLINICAL HISTORY:  Cough, unspecified    TECHNIQUE:  One view    COMPARISON:  December 14, 2022..    FINDINGS:  Cardiopericardial silhouette is within normal limits.  Interval resolution of right upper lung lobe large consolidation.  No acute dense focal or segmental consolidation, congestive process, pleural effusions or pneumothorax.                                       Medications - No data to display  Medical Decision Making:   History:   Old Records Summarized: records from clinic visits and records from previous admission(s).  Clinical Tests:   Lab Tests: Ordered and Reviewed  Radiological Study: Ordered and  Reviewed  1:30 PM DISPOSITION: The patient is resting comfortably in no acute distress.  She is hemodynamically stable and is without objective evidence for acute process requiring urgent intervention or hospitalization. I provided counseling to patient with regard to condition, the treatment plan, specific conditions for return, and the importance of follow up. Detailed written and verbal instructions provided to patient and she expressed a verbal understanding of the discharge instructions and overall management plan. Reiterated the importance of medication administration and safety and advised patient to follow up with primary care provider in 3-5 days or sooner if needed.  Answered questions at this time. The patient is stable for discharge.                           Clinical Impression:   Final diagnoses:  [R05.9] Cough  [M25.531] Right wrist pain  [J06.9] Upper respiratory tract infection, unspecified type (Primary)        ED Disposition Condition    Discharge Stable          ED Prescriptions       Medication Sig Dispense Start Date End Date Auth. Provider    diclofenac (VOLTAREN) 75 MG EC tablet Take 1 tablet (75 mg total) by mouth 2 (two) times daily as needed (pain). Do not take with ibuprofen, aleve, or other NSAID 20 tablet 2/4/2023 -- CLARISSA Abbott          Follow-up Information       Follow up With Specialties Details Why Contact Info    [facility] In 3 days  [address] [facility] [address]  [phone]      follow up at scheduled orthopedic clinic appointment on 2/8        [facility] Emergency Medicine  If symptoms worsen [address] [facility] [id number] [phone]             CLARISSA Abbott  02/04/23 1331

## 2023-02-08 ENCOUNTER — OFFICE VISIT (OUTPATIENT)
Dept: ORTHOPEDICS | Facility: CLINIC | Age: 53
End: 2023-02-08
Payer: MEDICAID

## 2023-02-08 ENCOUNTER — HOSPITAL ENCOUNTER (OUTPATIENT)
Dept: RADIOLOGY | Facility: HOSPITAL | Age: 53
Discharge: HOME | End: 2023-02-08
Attending: FAMILY MEDICINE
Payer: MEDICAID

## 2023-02-08 VITALS
BODY MASS INDEX: 25.66 KG/M2 | DIASTOLIC BLOOD PRESSURE: 105 MMHG | HEIGHT: 66 IN | WEIGHT: 159.63 LBS | SYSTOLIC BLOOD PRESSURE: 156 MMHG | HEART RATE: 118 BPM

## 2023-02-08 DIAGNOSIS — M79.644 PAIN IN FINGER OF RIGHT HAND: ICD-10-CM

## 2023-02-08 DIAGNOSIS — G56.01 RIGHT CARPAL TUNNEL SYNDROME: Primary | ICD-10-CM

## 2023-02-08 PROCEDURE — 73130 X-RAY EXAM OF HAND: CPT | Mod: TC,RT

## 2023-02-08 PROCEDURE — 99213 OFFICE O/P EST LOW 20 MIN: CPT | Mod: PBBFAC

## 2023-02-08 RX ORDER — TRIAMCINOLONE ACETONIDE 40 MG/ML
40 INJECTION, SUSPENSION INTRA-ARTICULAR; INTRAMUSCULAR ONCE
Status: COMPLETED | OUTPATIENT
Start: 2023-02-08 | End: 2023-02-08

## 2023-02-08 RX ORDER — INDOMETHACIN 50 MG/1
50 CAPSULE ORAL 3 TIMES DAILY
COMMUNITY

## 2023-02-08 RX ORDER — LIDOCAINE HYDROCHLORIDE 10 MG/ML
1 INJECTION, SOLUTION EPIDURAL; INFILTRATION; INTRACAUDAL; PERINEURAL
Status: COMPLETED | OUTPATIENT
Start: 2023-02-08 | End: 2023-02-08

## 2023-02-08 RX ADMIN — TRIAMCINOLONE ACETONIDE 40 MG: 40 INJECTION, SUSPENSION INTRA-ARTICULAR; INTRAMUSCULAR at 10:02

## 2023-02-08 RX ADMIN — LIDOCAINE HYDROCHLORIDE 1 ML: 10 INJECTION, SOLUTION EPIDURAL; INFILTRATION; INTRACAUDAL; PERINEURAL at 10:02

## 2023-02-08 NOTE — PROGRESS NOTES
"  Subjective:    Patient ID: Lidia Aguirre is a right handed 52 y.o. female  who presented to [facility] for new visit..      Chief Complaint: Pain of the Right Hand      History of Present Illness:    Lidia Aguirre who has no formally previously diagnosed musculoskeletal condition presented today with Carpal tunnel syndrome involving the right upper extremity for the past 2 months. Pain is located at volar wrist. Quality of pain is described as burning, sharp, and stabbing and rated a 7/10 in intensity.  Radiates to hand. Inciting event: none known.  Pain is aggravated by all activities and relieved by shaking her hand . There is not a history of injury. Evaluation to date: plain films and PCP evaluation. Treatment to date: avoidance of activity and oral analgesics. Expectations for today's visit includes: formal evaluation.  Occupation includes: . PCP is [facility].    Hand Review of Systems:  Swelling?  no  Instability?  no  Clicking?  no  Limited ROM? no  Fever/Chills? no  Subluxation? no  Dislocation? no  Numbness/Tingling? yes  Weakness? yes    Current Choice of Exercise:  none    ROS       Objective:      Physical Exam:    BP (!) 156/105   Pulse (!) 118   Ht 5' 6" (1.676 m)   Wt 72.4 kg (159 lb 9.6 oz)   BMI 25.76 kg/m²       Appearance:  Soft tissue swelling: Left: no Right: no  Effusion: Left:  Negative Right: Negative  Erythema: Left no Right: no  Ecchymosis: Left: no Right: no  Atrophy: Left: no Right: no    Palpation:  Hand/wrist Tenderness: Left: none  Right: none    Range of motion:  Flexion (0-80): Left:  80 Right: 80  Extension (0-70): Left:  70 Right: 70  Ulnar deviation (0-30): 30 Right: 30  Radial deviation (0-20): 20 Right: 20  Supination (0-90): Left: 90 Right: 90  Pronation (0-90): Left: 90 Right: 90  Able to make a power fist and claw hand: on Both hand(s)  Distal palmar crease-finger tip distance: 0 on Both " hand(s)    Strength:  Flexion: Left: 5/5 Pain: no Right: 5/5 Pain: no  Extension: Left: 5/5 Pain: no Right: 5/5 Pain: no  Supination: Left: 5/5 Pain: no Right: 5/5 Pain: no  Pronation: Left: 5/5 Pain: no Right: 5/5 Pain: no  Ulnar deviation: Left: 5/5 Pain: no Right: 5/5 Pain: no  Radial deviation: Left: 5/5 Pain: no Right: 5/5 Pain: no    Special Tests:  Durkans Test (Carpal Compression test): Left: Negative  Right: Positive  Tinels:  Left: Negative  Right: Positive    Phalens: Left: Negative  Right: Positive    Black Litler test:  Left: Not performed  Right: Not performed    UCL laxity: Left: Not performed  Right: Not performed  FDP test: Left: Negative  Right: Negative  FDS test: Left: Negative  Right: Negative  Reverse Phalens: Left: Not performed Right: Not performed  Finkelstein's Test: Left: Negative Right: Negative    Froments: Left: Not performed Right: Not performed  Shuck Test: Left: Not performed Right: Not performed    AIN/PIN/Radial nerve: Intact and symmetric    General appearance: NAD  Peripheral pulses: normal bilaterally   Reflexes: Left: Not performed Right: Not performed   Sensation: normal    Labs:  Last A1c: The patient doesn't have any registry metric data available     Imaging:   Previous images reviewed.  X-rays ordered and performed today: yes  # of views: 3 Laterality: right  My Interpretation:    no fracture, dislocation, swelling or degenerative changes noted      Assessment:        Encounter Diagnoses   Code Name Primary?    G56.01 Right carpal tunnel syndrome Yes      Plan:      Dx: right. Carpal tunnel syndrome Acute in moderate exacerbation.   Treatment Plan: Discussed with patient diagnosis and treatment recommendations.   - Natural history and expected course discussed. Questions answered.  - Educational material distributed.  - Reduction in offending activity.  - CTS injection. See procedure note.  - Home physical therapy exercise handouts provided to patient.   - Over the  counter NSAID and/or tylenol provided you do not have contraindications such as but not limited to liver or kidney disease or uncontrolled blood pressure. If you're doctors have told you to to not take them based on your health, do not take them.   - Using a brace provided to you here or from your local pharmacy or durable medical equipment (DME) store.   Imaging: radiological studies ordered and independently reviewed; discussed with patient; pending radiologist interpretation.   Procedure: Discussed CSI/VSI as treatment options; discussed CSI vs VS injections as treatment options; since conservative measures did not improve symptoms patient consented for CSI today.  Activity: Activity as tolerated  Therapy: No formal therapy  Medication: first line treatment with daily acetaminophen. Up to 1000 mg three times daily can be taken; medication precautions given.. Please see your primary care physician for further refills.  RTC: PRN; call if any issues.         Carpal Tunnel    Date/Time: 2/8/2023 10:50 AM  Performed by: Conner Galvan MD  Authorized by: Conner Galvan MD     Consent Done?:  Yes (Written)  Indications:  Pain and diagnostic evaluation  Site marked: the procedure site was marked    Timeout: prior to procedure the correct patient, procedure, and site was verified    Prep: patient was prepped and draped in usual sterile fashion      Local anesthesia used?: Yes    Local anesthetic:  Topical anesthetic  Location:  Wrist  Imaging guidance used: yes.    Needle size:  25 G  Approach:  Volar  Medications:  1 mL 1% Lidocaine - 1mL; 40 mg Kenalog 40  Patient tolerance:  Patient tolerated the procedure well with no immediate complications     Additional Comments: Staff: Janel Hu MD     Risks:  Possible complications with the injection include bleeding, infection (.01%), tendon rupture, steroid flare, fat pad or soft tissue atrophy, skin depigmentation, allergic reaction to medications and vasovagal  response. (steroid flare treatment is rest, ice, NSAIDs and resolves in 24-36 hours.)    Consent:  No absolute contraindications (cellulitis overlying joint, infection, lack of informed consent, allergy to injection medication, AVN protein or egg allergy for sodium hyaluronate, or history of steroid flare) or relative contraindications (uncontrolled DM2 A1c>10, coagulopathy, INR > 3.5, previous joint replacement or history of AVN).        Description:  The patient was prepped in normal sterile fashion use of chlorhexidine scrub and the appropriate and anatomic landmarks were identified with ultrasound.  Contents of syringe included: 1cc of 1% lidocaine with 40mg of Kenalog     Post Procedure: Patient alert, and moving all extremities. ROM improved, pain decreased.  Good peripheral pulses, no signs of vascular compromise and range of motion intact.  Aftercare instructions were given to patient at time of discharge.  Relative rest for 3 days-avoiding excess activity.  Place ice on the area for 15 minutes every 4-6 hours. Patient may take Tylenol a 1000 mg b.i.d. or ibuprofen 600 mg t.i.d. for the next 3-4 days if not on medication already and safe to take pending co-morbidities.  Protect the area for the next 1-8 hours if anesthetic was used.  Avoid excessive activity for the next 3-4 weeks.  ER precautions given for fever, severe joint pain or allergic reaction or other new symptoms related to the joint injection.        Conner Galvan

## 2023-02-09 NOTE — PROGRESS NOTES
Faculty Attestation: Lidia Aguirre  was seen in [facility] Medicine Clinic. Discussed with Dr. Galvan at the time of the visit. History of Present Illness, Physical Exam, and Assessment and Plan reviewed. Treatment plan is reasonable and appropriate. Compliance with treatment recommendations is important.  Radiology images independently reviewed and agree with radiologist interpretation. Procedure note reviewed. Patient tolerated procedure well.      Janel Hu MD  Family/Sports Medicine

## 2023-04-10 PROBLEM — Z00.00 WELL ADULT EXAM: Status: RESOLVED | Noted: 2023-01-04 | Resolved: 2023-04-10

## 2023-08-07 DIAGNOSIS — Z12.31 BREAST CANCER SCREENING BY MAMMOGRAM: Primary | ICD-10-CM

## 2024-04-09 ENCOUNTER — HOSPITAL ENCOUNTER (EMERGENCY)
Facility: HOSPITAL | Age: 54
Discharge: HOME | End: 2024-04-09
Attending: STUDENT IN AN ORGANIZED HEALTH CARE EDUCATION/TRAINING PROGRAM
Payer: MEDICAID

## 2024-04-09 VITALS
HEIGHT: 64 IN | WEIGHT: 197.06 LBS | RESPIRATION RATE: 20 BRPM | OXYGEN SATURATION: 97 % | SYSTOLIC BLOOD PRESSURE: 156 MMHG | BODY MASS INDEX: 33.64 KG/M2 | TEMPERATURE: 98 F | HEART RATE: 78 BPM | DIASTOLIC BLOOD PRESSURE: 98 MMHG

## 2024-04-09 DIAGNOSIS — R10.11 RUQ ABDOMINAL PAIN: Primary | ICD-10-CM

## 2024-04-09 LAB
ALBUMIN SERPL-MCNC: 4 G/DL (ref 3.5–5)
ALBUMIN/GLOB SERPL: 0.9 RATIO (ref 1.1–2)
ALP SERPL-CCNC: 120 UNIT/L (ref 40–150)
ALT SERPL-CCNC: 88 UNIT/L (ref 0–55)
APPEARANCE UR: CLEAR
AST SERPL-CCNC: 64 UNIT/L (ref 5–34)
BACTERIA #/AREA URNS AUTO: ABNORMAL /HPF
BASOPHILS # BLD AUTO: 0.04 X10(3)/MCL
BASOPHILS NFR BLD AUTO: 0.4 %
BILIRUB SERPL-MCNC: 0.4 MG/DL
BILIRUB UR QL STRIP.AUTO: NEGATIVE
BUN SERPL-MCNC: 13.6 MG/DL (ref 9.8–20.1)
CALCIUM SERPL-MCNC: 9.6 MG/DL (ref 8.4–10.2)
CHLORIDE SERPL-SCNC: 107 MMOL/L (ref 98–107)
CO2 SERPL-SCNC: 23 MMOL/L (ref 22–29)
COLOR UR AUTO: YELLOW
CREAT SERPL-MCNC: 0.82 MG/DL (ref 0.55–1.02)
EOSINOPHIL # BLD AUTO: 0.08 X10(3)/MCL (ref 0–0.9)
EOSINOPHIL NFR BLD AUTO: 0.9 %
ERYTHROCYTE [DISTWIDTH] IN BLOOD BY AUTOMATED COUNT: 12.3 % (ref 11.5–17)
GFR SERPLBLD CREATININE-BSD FMLA CKD-EPI: >60 MLS/MIN/1.73/M2
GLOBULIN SER-MCNC: 4.6 GM/DL (ref 2.4–3.5)
GLUCOSE SERPL-MCNC: 103 MG/DL (ref 74–100)
GLUCOSE UR QL STRIP.AUTO: NORMAL
HCT VFR BLD AUTO: 43.5 % (ref 37–47)
HGB BLD-MCNC: 14.5 G/DL (ref 12–16)
HOLD SPECIMEN: NORMAL
HOLD SPECIMEN: NORMAL
HYALINE CASTS #/AREA URNS LPF: ABNORMAL /LPF
IMM GRANULOCYTES # BLD AUTO: 0.02 X10(3)/MCL (ref 0–0.04)
IMM GRANULOCYTES NFR BLD AUTO: 0.2 %
KETONES UR QL STRIP.AUTO: ABNORMAL
LEUKOCYTE ESTERASE UR QL STRIP.AUTO: 25
LIPASE SERPL-CCNC: 32 U/L
LYMPHOCYTES # BLD AUTO: 2.02 X10(3)/MCL (ref 0.6–4.6)
LYMPHOCYTES NFR BLD AUTO: 22.5 %
MCH RBC QN AUTO: 32.4 PG (ref 27–31)
MCHC RBC AUTO-ENTMCNC: 33.3 G/DL (ref 33–36)
MCV RBC AUTO: 97.3 FL (ref 80–94)
MONOCYTES # BLD AUTO: 0.65 X10(3)/MCL (ref 0.1–1.3)
MONOCYTES NFR BLD AUTO: 7.3 %
MUCOUS THREADS URNS QL MICRO: ABNORMAL /LPF
NEUTROPHILS # BLD AUTO: 6.15 X10(3)/MCL (ref 2.1–9.2)
NEUTROPHILS NFR BLD AUTO: 68.7 %
NITRITE UR QL STRIP.AUTO: NEGATIVE
NRBC BLD AUTO-RTO: 0 %
PH UR STRIP.AUTO: 5.5 [PH]
PLATELET # BLD AUTO: 237 X10(3)/MCL (ref 130–400)
PMV BLD AUTO: 9.1 FL (ref 7.4–10.4)
POTASSIUM SERPL-SCNC: 3.4 MMOL/L (ref 3.5–5.1)
PROT SERPL-MCNC: 8.6 GM/DL (ref 6.4–8.3)
PROT UR QL STRIP.AUTO: ABNORMAL
RBC # BLD AUTO: 4.47 X10(6)/MCL (ref 4.2–5.4)
RBC #/AREA URNS AUTO: ABNORMAL /HPF
RBC UR QL AUTO: NEGATIVE
SODIUM SERPL-SCNC: 140 MMOL/L (ref 136–145)
SP GR UR STRIP.AUTO: 1.02 (ref 1–1.03)
SQUAMOUS #/AREA URNS LPF: ABNORMAL /HPF
UROBILINOGEN UR STRIP-ACNC: NORMAL
WBC # SPEC AUTO: 8.96 X10(3)/MCL (ref 4.5–11.5)
WBC #/AREA URNS AUTO: ABNORMAL /HPF

## 2024-04-09 PROCEDURE — 80053 COMPREHEN METABOLIC PANEL: CPT | Performed by: PHYSICIAN ASSISTANT

## 2024-04-09 PROCEDURE — 63600175 PHARM REV CODE 636 W HCPCS: Performed by: PHYSICIAN ASSISTANT

## 2024-04-09 PROCEDURE — 81001 URINALYSIS AUTO W/SCOPE: CPT | Performed by: PHYSICIAN ASSISTANT

## 2024-04-09 PROCEDURE — 83690 ASSAY OF LIPASE: CPT | Performed by: PHYSICIAN ASSISTANT

## 2024-04-09 PROCEDURE — 99284 EMERGENCY DEPT VISIT MOD MDM: CPT | Mod: 25

## 2024-04-09 PROCEDURE — 96372 THER/PROPH/DIAG INJ SC/IM: CPT | Performed by: PHYSICIAN ASSISTANT

## 2024-04-09 PROCEDURE — 85025 COMPLETE CBC W/AUTO DIFF WBC: CPT | Performed by: PHYSICIAN ASSISTANT

## 2024-04-09 RX ORDER — DICYCLOMINE HYDROCHLORIDE 20 MG/1
20 TABLET ORAL
Qty: 28 TABLET | Refills: 0 | Status: SHIPPED | OUTPATIENT
Start: 2024-04-09 | End: 2024-04-16

## 2024-04-09 RX ORDER — ONDANSETRON 4 MG/1
4 TABLET, ORALLY DISINTEGRATING ORAL EVERY 8 HOURS PRN
Qty: 15 TABLET | Refills: 0 | Status: SHIPPED | OUTPATIENT
Start: 2024-04-09 | End: 2024-05-09 | Stop reason: ALTCHOICE

## 2024-04-09 RX ORDER — METHOCARBAMOL 500 MG/1
500 TABLET, FILM COATED ORAL 3 TIMES DAILY PRN
Qty: 15 TABLET | Refills: 0 | Status: SHIPPED | OUTPATIENT
Start: 2024-04-09 | End: 2024-04-14

## 2024-04-09 RX ORDER — KETOROLAC TROMETHAMINE 30 MG/ML
30 INJECTION, SOLUTION INTRAMUSCULAR; INTRAVENOUS
Status: COMPLETED | OUTPATIENT
Start: 2024-04-09 | End: 2024-04-09

## 2024-04-09 RX ADMIN — KETOROLAC TROMETHAMINE 30 MG: 30 INJECTION, SOLUTION INTRAMUSCULAR; INTRAVENOUS at 05:04

## 2024-04-09 NOTE — FIRST PROVIDER EVALUATION
Medical screening examination initiated.  I have conducted a focused provider triage encounter, findings are as follows:    Brief history of present illness:  53 y.o. female complaining of right sided abdominal pain x 3 days. Worse when taking deep breaths.     There were no vitals filed for this visit.    Pertinent physical exam:  resting comfortably in NAD    Brief workup plan:  labs, UA    Preliminary workup initiated; this workup will be continued and followed by the physician or advanced practice provider that is assigned to the patient when roomed.

## 2024-04-09 NOTE — DISCHARGE INSTRUCTIONS
Take medication as prescribed.   Outpatient order for and Abdominal US ordered for evaluation of Gallbladder.  They will call you to schedule an appointment.  Follow up with your doctor within 2-3 days.   Return immediately if symptoms worsen.

## 2024-04-09 NOTE — ED PROVIDER NOTES
"Encounter Date: 2024       History     Chief Complaint   Patient presents with    Abdominal Pain     CO RUQ ABD PAIN W NAUSEA/DIARRHEA X 3 DAYS.       53-year-old female with a history of asthma and carpal tunnel syndrome, presents to the emergency department with complaints of right upper quadrant abdominal pain, decreased appetite, nausea and diarrhea x 3 days.  Patient does admit that she has worsening pain with movement.  She rates her pain 6/10.  She denies fever, chills, vomiting, chest pain, dysuria, hematuria.    The history is provided by the patient. No  was used.     Review of patient's allergies indicates:  No Known Allergies  Past Medical History:   Diagnosis Date    Allergy     Asthma     Carpal tunnel syndrome      Past Surgical History:   Procedure Laterality Date    APPENDECTOMY       SECTION       Family History   Problem Relation Age of Onset    Hypertension Mother     Heart disease Mother     Lupus Mother     Depression Father     Lupus Sister      Social History     Tobacco Use    Smoking status: Never    Smokeless tobacco: Never   Substance Use Topics    Alcohol use: Yes     Comment: occl"    Drug use: Not Currently     Review of Systems   Constitutional:  Positive for appetite change. Negative for chills and fever.   Respiratory:  Negative for cough, chest tightness and shortness of breath.    Cardiovascular:  Negative for chest pain and palpitations.   Gastrointestinal:  Positive for abdominal pain (RUQ), diarrhea and nausea. Negative for vomiting.   Genitourinary:  Negative for decreased urine volume, dysuria and hematuria.   Musculoskeletal:  Negative for back pain.   Skin:  Negative for rash and wound.   Neurological:  Negative for dizziness, light-headedness, numbness and headaches.       Physical Exam     Initial Vitals [24 1609]   BP Pulse Resp Temp SpO2   (!) 162/106 85 16 98.3 °F (36.8 °C) 98 %      MAP       --         Physical Exam    Nursing " note and vitals reviewed.  Constitutional: She appears well-developed and well-nourished.   HENT:   Head: Normocephalic and atraumatic.   Nose: Nose normal.   Eyes: Conjunctivae are normal.   Neck: Neck supple.   Normal range of motion.  Cardiovascular:  Normal rate, regular rhythm, normal heart sounds and intact distal pulses.           Pulmonary/Chest: Breath sounds normal.   Abdominal: Abdomen is soft. Bowel sounds are normal. There is abdominal tenderness (RUQ). There is no rebound, no guarding and negative Jones's sign.   Musculoskeletal:         General: Normal range of motion.      Cervical back: Normal range of motion and neck supple.     Neurological: She is alert.   Skin: Skin is warm. Capillary refill takes less than 2 seconds.         ED Course   Procedures  Labs Reviewed   COMPREHENSIVE METABOLIC PANEL - Abnormal; Notable for the following components:       Result Value    Potassium Level 3.4 (*)     Glucose Level 103 (*)     Protein Total 8.6 (*)     Globulin 4.6 (*)     Albumin/Globulin Ratio 0.9 (*)     Alanine Aminotransferase 88 (*)     Aspartate Aminotransferase 64 (*)     All other components within normal limits   URINALYSIS, REFLEX TO URINE CULTURE - Abnormal; Notable for the following components:    Protein, UA Trace (*)     Ketones, UA Trace (*)     Leukocyte Esterase, UA 25 (*)     Mucous, UA Occ (*)     All other components within normal limits   CBC WITH DIFFERENTIAL - Abnormal; Notable for the following components:    MCV 97.3 (*)     MCH 32.4 (*)     All other components within normal limits   LIPASE - Normal   CBC W/ AUTO DIFFERENTIAL    Narrative:     The following orders were created for panel order CBC auto differential.  Procedure                               Abnormality         Status                     ---------                               -----------         ------                     CBC with Differential[[id number]]        Abnormal            Final result                  Please view results for these tests on the individual orders.   EXTRA TUBES    Narrative:     The following orders were created for panel order EXTRA TUBES.  Procedure                               Abnormality         Status                     ---------                               -----------         ------                     Light Blue Top Hold[[id number]]                              Final result               Red Top Hold[[id number]]                                     Final result                 Please view results for these tests on the individual orders.   LIGHT BLUE TOP HOLD   RED TOP HOLD          Imaging Results    None          Medications   ketorolac injection 30 mg (30 mg Intramuscular Given 4/9/24 4578)     Medical Decision Making  53-year-old female with a history of asthma and carpal tunnel syndrome, presents to the emergency department with complaints of right upper quadrant abdominal pain, decreased appetite, nausea and diarrhea x 3 days.  Patient does admit that she has worsening pain with movement.  She rates her pain 6/10.  She denies fever, chills, vomiting, chest pain, dysuria, hematuria.    DDx:  Cholecystitis, cholelithiasis, constipation, UTI, GERD, pancreatitis    Mildly elevated liver enzymes.  They were elevated a year ago as well however today the slightly higher, otherwise CBC, CMP, lipase and urinalysis unremarkable.  Patient is mildly tender in her right upper quadrant with no positive Jones sign.  She does state it hurts more with movement.  She possibly has a pulled muscle vs gallstones.  Patient states she does not want to stay for further imaging but agrees to outpatient ultrasound for further evaluation.  Toradol IM given for pain.  I prescribed Zofran and Bentyl case pain is from gallbladder and Robaxin for possible pulled muscle.  Outpatient ultrasound ordered and she will follow up with her doctor.  Gave strict ED precautions.    Amount and/or Complexity of Data  Reviewed  Labs: ordered. Decision-making details documented in ED Course.  Radiology: ordered.    Risk  Prescription drug management.               ED Course as of 04/11/24 1329   Tue Apr 09, 2024   1700 ALT(!): 88 [ER]   1700 AST(!): 64 [ER]   1750 The patient is resting comfortably and in no acute distress.  She states that her symptoms have improved after treatment in Emergency Department. I personally discussed her test results and treatment plan.  Gave strict ED precautions, discussed specific conditions for return to the emergency department and importance of follow up with her primary care provider.  Patient voices understanding and agrees to the plan discussed. All patients' questions have been answered at this time.   She has remained hemodynamically stable throughout entire stay in ED and is stable for discharge home.  [ER]      ED Course User Index  [ER] Tamar Cee PA                             Clinical Impression:  Final diagnoses:  [R10.11] RUQ abdominal pain (Primary)          ED Disposition Condition    Discharge Stable          ED Prescriptions       Medication Sig Dispense Start Date End Date Auth. Provider    ondansetron (ZOFRAN-ODT) 4 MG TbDL Take 1 tablet (4 mg total) by mouth every 8 (eight) hours as needed (nausea). 15 tablet 4/9/2024 -- Tamar Cee PA    methocarbamoL (ROBAXIN) 500 MG Tab Take 1 tablet (500 mg total) by mouth 3 (three) times daily as needed (muscle spasm). For muscle spasm 15 tablet 4/9/2024 4/14/2024 Tamar Cee PA    dicyclomine (BENTYL) 20 mg tablet Take 1 tablet (20 mg total) by mouth 4 (four) times daily before meals and nightly. for 7 days 28 tablet 4/9/2024 4/16/2024 Tamar Cee PA          Follow-up Information       Follow up With Specialties Details Why Contact Info    [facility] - Emergency Dept Emergency Medicine  As needed, If symptoms worsen [address] [facility] [address] [phone]    Sanjana Wayne, [facility] Schedule an appointment as soon as possible for a visit in 2 days  [address] [facility] [address]  [phone]               Tamar Cee PA  04/11/24 7799

## 2024-04-09 NOTE — Clinical Note
"Lidia Farias" Carla was seen and treated in our emergency department on 4/9/2024.  She may return to work on 04/10/2024.  No heavy lifting until symptoms improve.       If you have any questions or concerns, please don't hesitate to call.      Tamar Cee PA"

## 2024-04-26 ENCOUNTER — HOSPITAL ENCOUNTER (OUTPATIENT)
Dept: RADIOLOGY | Facility: HOSPITAL | Age: 54
Discharge: HOME | End: 2024-04-26
Attending: PHYSICIAN ASSISTANT
Payer: MEDICAID

## 2024-04-26 DIAGNOSIS — R10.11 RUQ ABDOMINAL PAIN: ICD-10-CM

## 2024-04-26 PROCEDURE — 76705 ECHO EXAM OF ABDOMEN: CPT | Mod: TC

## 2024-04-30 ENCOUNTER — TELEPHONE (OUTPATIENT)
Dept: INTERNAL MEDICINE | Facility: CLINIC | Age: 54
End: 2024-04-30
Payer: MEDICAID

## 2024-04-30 DIAGNOSIS — Z13.0 SCREENING FOR IRON DEFICIENCY ANEMIA: ICD-10-CM

## 2024-04-30 DIAGNOSIS — Z13.220 SCREENING FOR LIPID DISORDERS: ICD-10-CM

## 2024-04-30 DIAGNOSIS — E87.6 HYPOKALEMIA: Primary | ICD-10-CM

## 2024-04-30 DIAGNOSIS — Z13.1 SCREENING FOR DIABETES MELLITUS: ICD-10-CM

## 2024-04-30 DIAGNOSIS — Z13.29 SCREENING FOR THYROID DISORDER: ICD-10-CM

## 2024-04-30 DIAGNOSIS — E55.9 VITAMIN D DEFICIENCY: ICD-10-CM

## 2024-04-30 DIAGNOSIS — Z00.00 WELL ADULT EXAM: ICD-10-CM

## 2024-04-30 NOTE — TELEPHONE ENCOUNTER
Pt called and stated she needed to schedule an appt. I scheduled pt for 5/9/2024 based off her 7/5/23 appt that she no showed to but pt wanted to know if she needs labs done prior to appt. Also, I see pt had an ER visit on 4/6/24 but never got an appt for a f/u. How would you like me to proceed? Please advise.

## 2024-04-30 NOTE — TELEPHONE ENCOUNTER
Ari Hollis,    Yes, I will put her wellness labs in. Tell her labs are fasting (nothing to eat or drink 6-8 hours prior to lab draw).    Thank you,    LYNNETTE Saldivar

## 2024-05-02 NOTE — TELEPHONE ENCOUNTER
Pt returned call to clinic. Informed pt of providers message below regarding her lab order. Pt verbalized understanding.

## 2024-05-08 ENCOUNTER — LAB VISIT (OUTPATIENT)
Dept: LAB | Facility: HOSPITAL | Age: 54
End: 2024-05-08
Payer: MEDICAID

## 2024-05-08 DIAGNOSIS — Z13.1 SCREENING FOR DIABETES MELLITUS: ICD-10-CM

## 2024-05-08 DIAGNOSIS — E55.9 VITAMIN D DEFICIENCY: ICD-10-CM

## 2024-05-08 DIAGNOSIS — Z13.29 SCREENING FOR THYROID DISORDER: ICD-10-CM

## 2024-05-08 DIAGNOSIS — E87.6 HYPOKALEMIA: ICD-10-CM

## 2024-05-08 DIAGNOSIS — Z13.0 SCREENING FOR IRON DEFICIENCY ANEMIA: ICD-10-CM

## 2024-05-08 DIAGNOSIS — Z13.220 SCREENING FOR LIPID DISORDERS: ICD-10-CM

## 2024-05-08 DIAGNOSIS — Z00.00 WELL ADULT EXAM: ICD-10-CM

## 2024-05-08 LAB
25(OH)D3+25(OH)D2 SERPL-MCNC: 21 NG/ML (ref 30–80)
ALBUMIN SERPL-MCNC: 4.2 G/DL (ref 3.5–5)
ALBUMIN/GLOB SERPL: 1 RATIO (ref 1.1–2)
ALP SERPL-CCNC: 106 UNIT/L (ref 40–150)
ALT SERPL-CCNC: 85 UNIT/L (ref 0–55)
AST SERPL-CCNC: 62 UNIT/L (ref 5–34)
BACTERIA #/AREA URNS AUTO: ABNORMAL /HPF
BASOPHILS # BLD AUTO: 0.03 X10(3)/MCL
BASOPHILS NFR BLD AUTO: 0.4 %
BILIRUB SERPL-MCNC: 0.7 MG/DL
BILIRUB UR QL STRIP.AUTO: NEGATIVE
BUN SERPL-MCNC: 12.8 MG/DL (ref 9.8–20.1)
CALCIUM SERPL-MCNC: 9.8 MG/DL (ref 8.4–10.2)
CHLORIDE SERPL-SCNC: 105 MMOL/L (ref 98–107)
CHOLEST SERPL-MCNC: 229 MG/DL
CHOLEST/HDLC SERPL: 4 {RATIO} (ref 0–5)
CLARITY UR: ABNORMAL
CO2 SERPL-SCNC: 24 MMOL/L (ref 22–29)
COLOR UR AUTO: YELLOW
CREAT SERPL-MCNC: 0.77 MG/DL (ref 0.55–1.02)
CREAT UR-MCNC: 136.3 MG/DL (ref 45–106)
EOSINOPHIL # BLD AUTO: 0.07 X10(3)/MCL (ref 0–0.9)
EOSINOPHIL NFR BLD AUTO: 0.9 %
ERYTHROCYTE [DISTWIDTH] IN BLOOD BY AUTOMATED COUNT: 12.5 % (ref 11.5–17)
EST. AVERAGE GLUCOSE BLD GHB EST-MCNC: 125.5 MG/DL
FERRITIN SERPL-MCNC: 412.29 NG/ML (ref 4.63–204)
GFR SERPLBLD CREATININE-BSD FMLA CKD-EPI: >60 ML/MIN/1.73/M2
GLOBULIN SER-MCNC: 4.3 GM/DL (ref 2.4–3.5)
GLUCOSE SERPL-MCNC: 86 MG/DL (ref 74–100)
GLUCOSE UR QL STRIP: NEGATIVE
HBA1C MFR BLD: 6 %
HCT VFR BLD AUTO: 42 % (ref 37–47)
HDLC SERPL-MCNC: 65 MG/DL (ref 35–60)
HGB BLD-MCNC: 14.2 G/DL (ref 12–16)
HGB UR QL STRIP: ABNORMAL
IMM GRANULOCYTES # BLD AUTO: 0.02 X10(3)/MCL (ref 0–0.04)
IMM GRANULOCYTES NFR BLD AUTO: 0.3 %
IRON SATN MFR SERPL: 42 % (ref 20–50)
IRON SERPL-MCNC: 139 UG/DL (ref 50–170)
KETONES UR QL STRIP: 15
LDLC SERPL CALC-MCNC: 144 MG/DL (ref 50–140)
LEUKOCYTE ESTERASE UR QL STRIP: ABNORMAL
LYMPHOCYTES # BLD AUTO: 1.56 X10(3)/MCL (ref 0.6–4.6)
LYMPHOCYTES NFR BLD AUTO: 20.1 %
MCH RBC QN AUTO: 32.1 PG (ref 27–31)
MCHC RBC AUTO-ENTMCNC: 33.8 G/DL (ref 33–36)
MCV RBC AUTO: 94.8 FL (ref 80–94)
MICROALBUMIN UR-MCNC: 43.4 UG/ML
MICROALBUMIN/CREAT RATIO PNL UR: 31.8 MG/GM CR (ref 0–30)
MONOCYTES # BLD AUTO: 0.5 X10(3)/MCL (ref 0.1–1.3)
MONOCYTES NFR BLD AUTO: 6.4 %
NEUTROPHILS # BLD AUTO: 5.6 X10(3)/MCL (ref 2.1–9.2)
NEUTROPHILS NFR BLD AUTO: 71.9 %
NITRITE UR QL STRIP: NEGATIVE
NRBC BLD AUTO-RTO: 0 %
PH UR STRIP: 6 [PH]
PLATELET # BLD AUTO: 244 X10(3)/MCL (ref 130–400)
PMV BLD AUTO: 9.1 FL (ref 7.4–10.4)
POTASSIUM SERPL-SCNC: 4.2 MMOL/L (ref 3.5–5.1)
PROT SERPL-MCNC: 8.5 GM/DL (ref 6.4–8.3)
PROT UR QL STRIP: NEGATIVE
RBC # BLD AUTO: 4.43 X10(6)/MCL (ref 4.2–5.4)
RBC #/AREA URNS AUTO: ABNORMAL /HPF
SODIUM SERPL-SCNC: 140 MMOL/L (ref 136–145)
SP GR UR STRIP.AUTO: >=1.03 (ref 1–1.03)
SQUAMOUS #/AREA URNS AUTO: ABNORMAL /HPF
T4 FREE SERPL-MCNC: 0.97 NG/DL (ref 0.7–1.48)
TIBC SERPL-MCNC: 194 UG/DL (ref 70–310)
TIBC SERPL-MCNC: 333 UG/DL (ref 250–450)
TRANSFERRIN SERPL-MCNC: 294 MG/DL (ref 180–382)
TRIGL SERPL-MCNC: 100 MG/DL (ref 37–140)
TSH SERPL-ACNC: 1.39 UIU/ML (ref 0.35–4.94)
UROBILINOGEN UR STRIP-ACNC: 0.2
VLDLC SERPL CALC-MCNC: 20 MG/DL
WBC # SPEC AUTO: 7.78 X10(3)/MCL (ref 4.5–11.5)
WBC #/AREA URNS AUTO: ABNORMAL /HPF

## 2024-05-08 PROCEDURE — 82043 UR ALBUMIN QUANTITATIVE: CPT

## 2024-05-08 PROCEDURE — 82306 VITAMIN D 25 HYDROXY: CPT

## 2024-05-08 PROCEDURE — 84439 ASSAY OF FREE THYROXINE: CPT

## 2024-05-08 PROCEDURE — 83540 ASSAY OF IRON: CPT

## 2024-05-08 PROCEDURE — 80053 COMPREHEN METABOLIC PANEL: CPT

## 2024-05-08 PROCEDURE — 82728 ASSAY OF FERRITIN: CPT

## 2024-05-08 PROCEDURE — 85025 COMPLETE CBC W/AUTO DIFF WBC: CPT

## 2024-05-08 PROCEDURE — 84443 ASSAY THYROID STIM HORMONE: CPT

## 2024-05-08 PROCEDURE — 81001 URINALYSIS AUTO W/SCOPE: CPT

## 2024-05-08 PROCEDURE — 36415 COLL VENOUS BLD VENIPUNCTURE: CPT

## 2024-05-08 PROCEDURE — 83036 HEMOGLOBIN GLYCOSYLATED A1C: CPT

## 2024-05-08 PROCEDURE — 80061 LIPID PANEL: CPT

## 2024-05-08 PROCEDURE — 87086 URINE CULTURE/COLONY COUNT: CPT

## 2024-05-09 ENCOUNTER — OFFICE VISIT (OUTPATIENT)
Dept: INTERNAL MEDICINE | Facility: CLINIC | Age: 54
End: 2024-05-09
Payer: MEDICAID

## 2024-05-09 ENCOUNTER — HOSPITAL ENCOUNTER (OUTPATIENT)
Dept: RADIOLOGY | Facility: HOSPITAL | Age: 54
Discharge: HOME | End: 2024-05-09
Payer: MEDICAID

## 2024-05-09 VITALS
WEIGHT: 198.19 LBS | RESPIRATION RATE: 20 BRPM | HEART RATE: 87 BPM | OXYGEN SATURATION: 97 % | DIASTOLIC BLOOD PRESSURE: 104 MMHG | HEIGHT: 64 IN | TEMPERATURE: 98 F | BODY MASS INDEX: 33.84 KG/M2 | SYSTOLIC BLOOD PRESSURE: 168 MMHG

## 2024-05-09 DIAGNOSIS — R74.01 TRANSAMINITIS: ICD-10-CM

## 2024-05-09 DIAGNOSIS — I10 PRIMARY HYPERTENSION: ICD-10-CM

## 2024-05-09 DIAGNOSIS — R79.89 ELEVATED FERRITIN: ICD-10-CM

## 2024-05-09 DIAGNOSIS — N34.2 INFECTIVE URETHRITIS: ICD-10-CM

## 2024-05-09 DIAGNOSIS — Z00.00 WELL ADULT EXAM: Primary | ICD-10-CM

## 2024-05-09 DIAGNOSIS — Z12.11 SCREENING FOR MALIGNANT NEOPLASM OF COLON: ICD-10-CM

## 2024-05-09 DIAGNOSIS — Z12.31 BREAST CANCER SCREENING BY MAMMOGRAM: ICD-10-CM

## 2024-05-09 DIAGNOSIS — R73.03 PREDIABETES: ICD-10-CM

## 2024-05-09 DIAGNOSIS — K76.0 HEPATIC STEATOSIS: ICD-10-CM

## 2024-05-09 DIAGNOSIS — E78.2 MIXED HYPERLIPIDEMIA: ICD-10-CM

## 2024-05-09 DIAGNOSIS — E66.09 CLASS 1 OBESITY DUE TO EXCESS CALORIES WITH SERIOUS COMORBIDITY AND BODY MASS INDEX (BMI) OF 34.0 TO 34.9 IN ADULT: ICD-10-CM

## 2024-05-09 DIAGNOSIS — Z78.0 POST-MENOPAUSAL: ICD-10-CM

## 2024-05-09 DIAGNOSIS — E55.9 VITAMIN D DEFICIENCY: ICD-10-CM

## 2024-05-09 PROBLEM — E66.811 CLASS 1 OBESITY DUE TO EXCESS CALORIES WITH SERIOUS COMORBIDITY AND BODY MASS INDEX (BMI) OF 34.0 TO 34.9 IN ADULT: Status: ACTIVE | Noted: 2024-05-09

## 2024-05-09 PROCEDURE — 77063 BREAST TOMOSYNTHESIS BI: CPT | Mod: 26,,, | Performed by: RADIOLOGY

## 2024-05-09 PROCEDURE — 99214 OFFICE O/P EST MOD 30 MIN: CPT | Mod: S$PBB,25,,

## 2024-05-09 PROCEDURE — 77067 SCR MAMMO BI INCL CAD: CPT | Mod: 26,,, | Performed by: RADIOLOGY

## 2024-05-09 PROCEDURE — 3008F BODY MASS INDEX DOCD: CPT | Mod: CPTII,,,

## 2024-05-09 PROCEDURE — 3080F DIAST BP >= 90 MM HG: CPT | Mod: CPTII,,,

## 2024-05-09 PROCEDURE — 99396 PREV VISIT EST AGE 40-64: CPT | Mod: S$PBB,,,

## 2024-05-09 PROCEDURE — 3077F SYST BP >= 140 MM HG: CPT | Mod: CPTII,,,

## 2024-05-09 PROCEDURE — 1159F MED LIST DOCD IN RCRD: CPT | Mod: CPTII,,,

## 2024-05-09 PROCEDURE — 77067 SCR MAMMO BI INCL CAD: CPT | Mod: TC

## 2024-05-09 PROCEDURE — 3044F HG A1C LEVEL LT 7.0%: CPT | Mod: CPTII,,,

## 2024-05-09 PROCEDURE — 99215 OFFICE O/P EST HI 40 MIN: CPT | Mod: PBBFAC

## 2024-05-09 PROCEDURE — 1160F RVW MEDS BY RX/DR IN RCRD: CPT | Mod: CPTII,,,

## 2024-05-09 PROCEDURE — 3060F POS MICROALBUMINURIA REV: CPT | Mod: CPTII,,,

## 2024-05-09 PROCEDURE — 3066F NEPHROPATHY DOC TX: CPT | Mod: CPTII,,,

## 2024-05-09 RX ORDER — NAPROXEN 500 MG/1
500 TABLET ORAL 2 TIMES DAILY PRN
COMMUNITY
Start: 2023-12-21 | End: 2024-05-09 | Stop reason: ALTCHOICE

## 2024-05-09 RX ORDER — EZETIMIBE 10 MG/1
10 TABLET ORAL DAILY
Qty: 90 TABLET | Refills: 3 | Status: SHIPPED | OUTPATIENT
Start: 2024-05-09 | End: 2025-05-09

## 2024-05-09 RX ORDER — ACETAMINOPHEN 500 MG
2000 TABLET ORAL DAILY
Qty: 90 CAPSULE | Refills: 2 | Status: SHIPPED | OUTPATIENT
Start: 2024-05-09 | End: 2025-05-09

## 2024-05-09 RX ORDER — ACETAMINOPHEN 500 MG
1 TABLET ORAL 2 TIMES DAILY
Qty: 1 EACH | Refills: 0 | Status: SHIPPED | OUTPATIENT
Start: 2024-05-09

## 2024-05-09 RX ORDER — IBUPROFEN 800 MG/1
800 TABLET ORAL 3 TIMES DAILY PRN
COMMUNITY
Start: 2023-12-27 | End: 2024-05-09 | Stop reason: ALTCHOICE

## 2024-05-09 RX ORDER — AMLODIPINE BESYLATE 5 MG/1
5 TABLET ORAL DAILY
Qty: 90 TABLET | Refills: 2 | Status: SHIPPED | OUTPATIENT
Start: 2024-05-09 | End: 2025-05-09

## 2024-05-09 NOTE — ASSESSMENT & PLAN NOTE
Wellness labs - 5/8/2024.  Cervical Cancer Screening - Last Pap on approximately a year ago with Dr. Cartwright . GYN appt: 5/24/2024.  Breast Cancer Screening - Last Mammogram approximately 1 year. Follow up annually. Ordered on 8/7/2023.  Colon Cancer Screening - No prior screening. Cologuard ordered (5/9/2024).  Osteoporosis Screening - Deferred due to age.   Eye Exam - Several years. List of local eye doctors provided to patient.  Dental Exam - Several years. List of local dentists provided to patient.  Vaccinations: Flu - Recommended annually, not currently being offered. / Pneumococcal - Declines / Shingles - Declines / Tetanus - Declines

## 2024-05-09 NOTE — ASSESSMENT & PLAN NOTE
BP Readings from Last 3 Encounters:   05/09/24 (!) 168/104   04/09/24 (!) 156/98   02/08/23 (!) 156/105      Not at goal. New DX.  Follow a low sodium (less than 2 grams of sodium per day), DASH diet.   Rx amlodipine.  Monitor blood pressure and report any consistent values greater than 140/90 and keep a log.  BP monitor ordered - case management consulted to provide voucher if available.   Maintain healthy weight with a BMI goal of <30.   Aerobic exercise for 150 minutes per week (or 5 days a week for 30 minutes each day).

## 2024-05-09 NOTE — ASSESSMENT & PLAN NOTE
Please decrease the amount of saturated fats in your diet.    Choose a diet that emphasizes vegetables and avoids too much sugar and alcohol. Maintain a healthy weight: Those who are overweight or obese should reduce their daily calorie intake and exercise more.

## 2024-05-09 NOTE — ASSESSMENT & PLAN NOTE
Body mass index is 34.02 kg/m².  Goal BMI <30.  Aerobic exercise 150 minutes per week.  Avoid soda, simple sugars, sweets, excessive rice, pasta, potatoes or bread.   Choose brown options when available and portion control.  Limit fast foods and fried foods.   Choose complex carbs in moderation (ex: green, leafy vegetables, beans, oatmeal).  Eat plenty of fresh fruits and vegetables with lean meats daily.   Consider permanent healthy lifestyle changes.

## 2024-05-09 NOTE — ASSESSMENT & PLAN NOTE
4/26/2024  EXAMINATION:  US ABDOMEN LIMITED     CLINICAL HISTORY:  RUQ abdominal pain, elevated liver enzymes; Right upper quadrant pain     TECHNIQUE:  Limited ultrasound of the right upper quadrant of the abdomen was performed.     COMPARISON:  None     FINDINGS:  LIVER: 20 cm cranial caudal at the midclavicular line. Increased echogenicity with decreased penetration of the ultrasound beam. No focal mass appreciable. Portal vein is patent with appropriate directional flow.     PANCREAS: The visualized portions of pancreas appear normal.     GALLBLADDER: No shadowing calculi, wall thickening, or pericholecystic fluid.     BILE DUCTS: 4 mm common bile duct.  Distal common bile duct is obscured by shadowing bowel gas.     INFERIOR VENA CAVA: Normal in appearance.     RIGHT KIDNEY: 9.9 cm. No hydronephrosis.     OTHER: No ascites.     Impression:     Hepatomegaly with pronounced steatosis

## 2024-05-09 NOTE — ASSESSMENT & PLAN NOTE
Lab Results   Component Value Date    [id number] 21 (L) 05/08/2024     Educated on increasing foods high in Vitamin D such as fish oil, cod liver oil, salmon, milk fortified with vitamin D.  RX Vitamin D3 86145 IU weekly x 12 weeks.  Complete entire 12 weeks of Vitamin D prescription.  After completion of prescription (12 weeks/3 months), begin taking Vitamin D 2000 I.U. tablets daily (purchase over the counter).  Repeat Vitamin D level as ordered.

## 2024-05-09 NOTE — ASSESSMENT & PLAN NOTE
Lab Results   Component Value Date    AST 62 (H) 05/08/2024    AST 64 (H) 04/09/2024    ALT 85 (H) 05/08/2024    ALT 88 (H) 04/09/2024    ALKPHOS 106 05/08/2024    ALKPHOS 120 04/09/2024      Likely due to hepatic steatosis.  Limit acetaminophen intake. No more than 4 gm in 24 hours.  Vodka cessation.  Referral placed to [facility] GI clinic to eval and treat.

## 2024-05-09 NOTE — ASSESSMENT & PLAN NOTE
Latest Reference Range & Units 05/08/24 14:08   Color, UA Yellow, Light-Yellow, Dark Yellow, Selena, Straw  Yellow   Appearance, UA Clear  SL CLOUDY !   Specific Gravity,UA 1.005 - 1.030  >=1.030   pH, UA 5.0 - 8.5  6.0   Protein, UA Negative  Negative   Ketones, UA Negative  15 !   Blood, UA Negative  Trace !   NITRITE UA Negative  Negative   Bilirubin, UA Negative  Negative   Urobilinogen, UA 0.2, 1.0, Normal  0.2   Leukocyte Esterase, UA Negative  Trace !   RBC, UA None Seen, 0-2, 3-5, 0-5 /HPF None Seen   WBC, UA None Seen, 0-2, 3-5, 0-5 /HPF 11-20 !   Bacteria, UA None Seen, Rare, Occasional /HPF None Seen   Squam Epithel, UA None Seen, Rare, Occasional, Occ /HPF Moderate !   !: Data is abnormal    Urine culture pending. Awaiting final cultures prior to treating.

## 2024-05-09 NOTE — PROGRESS NOTES
PATIENT NAME: Lidia Aguirre  : 1970  DATE: 24  MRN: [id number]          Reason for Visit/Chief Complaint   Medication Refill, Annual Exam, and Results       History of Present Illness (HPI)     Lidia Aguirre is a 53 y.o. White female presenting in clinic today for Medication Refill, Annual Exam, and Results. PMH: CTS, asthma, and seasonal allergies.      All pertinent labs dated 2024 reviewed and discussed with patient.     Patient presented to [facility] ED on 2024 with c/o RUQ abd pain with nausea and vomiting x3 days. Patient was noted to have elevated liver enzymes. Liver US reveals pronounced hepatic steatosis. Patient states she has changed her diet. Has incorporated more vegetables. She denies reflux or abdominal pain.    Bp-168/104 - not at goal. Denies any prior treatment of BP. Asymptomatic.      Denies smoking. Drinks vodka mixed 3-4 x's per week. Denies illicit drug use. Denies chest pain, shortness of breath, cough, headache, dizziness, weakness, abdominal pain, nausea, vomiting, diarrhea, constipation, dysuria, depression, anxiety, SI, and HI.     Cervical Cancer Screening - Last Pap on approximately a year ago with Dr. Cartwright . GYN appt: 2024.  Breast Cancer Screening - Last Mammogram approximately 1 year. Follow up annually. Ordered on 2023.  Colon Cancer Screening - No prior screening. Cologuard ordered (2024).  Osteoporosis Screening -DEXA ordered (2024).  Eye Exam - Several years. List of local eye doctors provided to patient.  Dental Exam - Several years. List of local dentists provided to patient.  Vaccinations: Flu - Recommended annually, not currently being offered. / Pneumococcal - Declines / Shingles - Declines / Tetanus - Declines       Review of Systems     Review of Systems   Constitutional: Negative.    HENT: Negative.     Eyes: Negative.    Respiratory: Negative.     Cardiovascular: Negative.    Gastrointestinal: Negative.  Negative for abdominal  "distention, abdominal pain, diarrhea and nausea.   Endocrine: Negative.    Genitourinary: Negative.    Musculoskeletal: Negative.    Skin: Negative.    Allergic/Immunologic: Negative.    Neurological: Negative.    Hematological: Negative.    Psychiatric/Behavioral: Negative.     All other systems reviewed and are negative.      Medical / Social / Family History     Past Medical History:   Diagnosis Date    Allergy     Asthma     Carpal tunnel syndrome          Past Surgical History:   Procedure Laterality Date    APPENDECTOMY       SECTION           Social History  Lidia Winston  reports that she has never smoked. She has never used smokeless tobacco. She reports current alcohol use. She reports that she does not currently use drugs.    Family History  Lidia Winston family history includes Depression in her father; Heart disease in her mother; Hypertension in her mother; Lupus in her mother and sister.    Medications and Allergies     Medications  Current Outpatient Medications   Medication Instructions    amLODIPine (NORVASC) 5 mg, Oral, Daily    blood pressure monitor Kit 1 each, Misc.(Non-Drug; Combo Route), 2 times daily    cholecalciferol (vitamin D3) (VITAMIN D3) 2,000 Units, Oral, Daily    ezetimibe (ZETIA) 10 mg, Oral, Daily       Allergies  Review of patient's allergies indicates:  No Known Allergies    Physical Examination   Visit Vitals  BP (!) 168/104 (BP Location: Right arm, Patient Position: Sitting, BP Method: Large (Automatic))   Pulse 87   Temp 97.7 °F (36.5 °C) (Oral)   Resp 20   Ht 5' 4" (1.626 m)   Wt 89.9 kg (198 lb 3.1 oz)   SpO2 97%   BMI 34.02 kg/m²     Physical Exam  Vitals reviewed.   Constitutional:       Appearance: Normal appearance. She is obese.   HENT:      Head: Normocephalic and atraumatic.      Right Ear: External ear normal.      Left Ear: External ear normal.      Nose: Nose normal.      Mouth/Throat:      Mouth: Mucous membranes are moist.      Pharynx: Oropharynx " is clear.   Eyes:      Extraocular Movements: Extraocular movements intact.      Conjunctiva/sclera: Conjunctivae normal.      Pupils: Pupils are equal, round, and reactive to light.   Cardiovascular:      Rate and Rhythm: Normal rate and regular rhythm.      Pulses: Normal pulses.      Heart sounds: Normal heart sounds.   Pulmonary:      Effort: Pulmonary effort is normal.      Breath sounds: Normal breath sounds.   Abdominal:      General: Bowel sounds are normal.      Palpations: Abdomen is soft.   Musculoskeletal:         General: Normal range of motion.      Cervical back: Normal range of motion and neck supple.   Skin:     General: Skin is warm and dry.      Capillary Refill: Capillary refill takes less than 2 seconds.   Neurological:      General: No focal deficit present.      Mental Status: She is alert and oriented to person, place, and time.   Psychiatric:         Mood and Affect: Mood normal.         Behavior: Behavior normal.         Thought Content: Thought content normal.         Judgment: Judgment normal.           Results     Lab Results   Component Value Date    WBC 7.78 05/08/2024    RBC 4.43 05/08/2024    HGB 14.2 05/08/2024    HCT 42.0 05/08/2024    MCV 94.8 (H) 05/08/2024    MCH 32.1 (H) 05/08/2024    MCHC 33.8 05/08/2024    RDW 12.5 05/08/2024     05/08/2024    MPV 9.1 05/08/2024      Lab Results   Component Value Date     05/08/2024    K 4.2 05/08/2024    CHLORIDE 105 05/08/2024    CO2 24 05/08/2024    GLUCOSE 86 05/08/2024    BUN 12.8 05/08/2024    CREATININE 0.77 05/08/2024    LABPROT 8.5 (H) 05/08/2024    ALBUMIN 4.2 05/08/2024    BILITOT 0.7 05/08/2024    ALKPHOS 106 05/08/2024    AST 62 (H) 05/08/2024    ALT 85 (H) 05/08/2024    EGFRNORACEVR >60 05/08/2024     Lab Results   Component Value Date    TSH 1.387 05/08/2024     Lab Results   Component Value Date    CHOL 229 (H) 05/08/2024    HDL 65 (H) 05/08/2024    .00 (H) 05/08/2024    TRIG 100 05/08/2024     Lab Results    Component Value Date    COLORUA Yellow 05/08/2024    SGUA >=1.030 05/08/2024    PROTEINUA Negative 05/08/2024    GLUCOSEUA Negative 05/08/2024    BILIRUBINUA Negative 05/08/2024    BLOODUA Trace (A) 05/08/2024    WBCUA 11-20 (A) 05/08/2024    RBCUA None Seen 05/08/2024    BACTERIA None Seen 05/08/2024    NITRITESUA Negative 05/08/2024    LEUKOCYTESUR Trace (A) 05/08/2024    UROBILINOGEN 0.2 05/08/2024     Lab Results   Component Value Date    CREATRANDUR 136.3 (H) 05/08/2024    MICALBCREAT 31.8 (H) 05/08/2024     Lab Results   Component Value Date    ZPFGHBQX66HI 21 (L) 05/08/2024     Lab Results   Component Value Date    HIV Nonreactive 12/20/2022    HEPAIGM Nonreactive 12/20/2022    HEPBCOREM Nonreactive 12/20/2022    HEPCAB Nonreactive 12/20/2022     Assessment and Plan (including Health Maintenance)     Problem List Items Addressed This Visit          Cardiac/Vascular    Mixed hyperlipidemia    Current Assessment & Plan     Lab Results   Component Value Date    .00 (H) 05/08/2024       Lab Results   Component Value Date    TRIG 100 05/08/2024       Lab Results   Component Value Date    HDL 65 (H) 05/08/2024        Lab Results   Component Value Date    CHOL 229 (H) 05/08/2024      No statin due to transaminitis.  Rx zetia.  Follow a low cholesterol, low saturated fat diet with less than 200 mg of cholesterol a day.   Avoid fried foods and high saturated fats.  Add flax seed or fish oil supplements to diet.   Increase dietary fiber.   Regular exercise improves cholesterol levels.  Physical activity 5 times a week for 30 minutes per day (or 150 minutes per week).   Stressed importance of dietary modifications.          Relevant Medications    ezetimibe (ZETIA) 10 mg tablet    Other Relevant Orders    Lipid Panel    Primary hypertension    Current Assessment & Plan     BP Readings from Last 3 Encounters:   05/09/24 (!) 168/104   04/09/24 (!) 156/98   02/08/23 (!) 156/105      Not at goal. New DX.  Follow a  low sodium (less than 2 grams of sodium per day), DASH diet.   Rx amlodipine.  Monitor blood pressure and report any consistent values greater than 140/90 and keep a log.  BP monitor ordered - case management consulted to provide voucher if available.   Maintain healthy weight with a BMI goal of <30.   Aerobic exercise for 150 minutes per week (or 5 days a week for 30 minutes each day).           Relevant Medications    amLODIPine (NORVASC) 5 MG tablet    blood pressure monitor Kit    Other Relevant Orders    Ambulatory referral/consult to Outpatient Case Management    CBC Auto Differential    Comprehensive Metabolic Panel    Microalbumin/Creatinine Ratio, Urine    Urinalysis       Renal/    Infective urethritis    Current Assessment & Plan      Latest Reference Range & Units 05/08/24 14:08   Color, UA Yellow, Light-Yellow, Dark Yellow, Selena, Straw  Yellow   Appearance, UA Clear  SL CLOUDY !   Specific Gravity,UA 1.005 - 1.030  >=1.030   pH, UA 5.0 - 8.5  6.0   Protein, UA Negative  Negative   Ketones, UA Negative  15 !   Blood, UA Negative  Trace !   NITRITE UA Negative  Negative   Bilirubin, UA Negative  Negative   Urobilinogen, UA 0.2, 1.0, Normal  0.2   Leukocyte Esterase, UA Negative  Trace !   RBC, UA None Seen, 0-2, 3-5, 0-5 /HPF None Seen   WBC, UA None Seen, 0-2, 3-5, 0-5 /HPF 11-20 !   Bacteria, UA None Seen, Rare, Occasional /HPF None Seen   Squam Epithel, UA None Seen, Rare, Occasional, Occ /HPF Moderate !   !: Data is abnormal    Urine culture pending. Awaiting final cultures prior to treating.         Relevant Orders    Urinalysis    Post-menopausal    Current Assessment & Plan     Bone mineral density scan ordered.          Relevant Orders    DXA Bone Density Axial Skeleton 1 or more sites       Oncology    Elevated ferritin    Current Assessment & Plan     Lab Results   Component Value Date    FERRITIN 412.29 (H) 05/08/2024    Jak2, Hemochromatosis, folate, B12 ordered.  Will refer to [facility] hem/onc  clinic if indicated.          Relevant Orders    Iron and TIBC    Ferritin       Endocrine    Vitamin D deficiency    Current Assessment & Plan     Lab Results   Component Value Date    KNLEOHTT48FM 21 (L) 05/08/2024     Educated on increasing foods high in Vitamin D such as fish oil, cod liver oil, salmon, milk fortified with vitamin D.  RX Vitamin D3 12894 IU weekly x 12 weeks.  Complete entire 12 weeks of Vitamin D prescription.  After completion of prescription (12 weeks/3 months), begin taking Vitamin D 2000 I.U. tablets daily (purchase over the counter).  Repeat Vitamin D level as ordered.          Relevant Medications    cholecalciferol, vitamin D3, (VITAMIN D3) 50 mcg (2,000 unit) Cap capsule    Other Relevant Orders    Vitamin D    Class 1 obesity due to excess calories with serious comorbidity and body mass index (BMI) of 34.0 to 34.9 in adult    Current Assessment & Plan     Body mass index is 34.02 kg/m².  Goal BMI <30.  Aerobic exercise 150 minutes per week.  Avoid soda, simple sugars, sweets, excessive rice, pasta, potatoes or bread.   Choose brown options when available and portion control.  Limit fast foods and fried foods.   Choose complex carbs in moderation (ex: green, leafy vegetables, beans, oatmeal).  Eat plenty of fresh fruits and vegetables with lean meats daily.   Consider permanent healthy lifestyle changes.          Prediabetes    Current Assessment & Plan     Lab Results   Component Value Date    HGBA1C 6.0 05/08/2024     Medication initiation if HgbA1C becomes >6.5.  Avoid soda, simple sweets, and limit rice/pasta/bread/starches and consume brown options when possible.    Maintain healthy weight with BMI goal <30.    Perform aerobic exercise for 150 minutes per week (or 5 days a week for 30 minutes each day).           Relevant Orders    Hemoglobin A1C       GI    Screening for malignant neoplasm of colon    Current Assessment & Plan     Cologuard ordered, will refer to GI if indicated.           Relevant Orders    Cologuard Screening (Multitarget Stool DNA)    Transaminitis    Current Assessment & Plan     Lab Results   Component Value Date    AST 62 (H) 05/08/2024    AST 64 (H) 04/09/2024    ALT 85 (H) 05/08/2024    ALT 88 (H) 04/09/2024    ALKPHOS 106 05/08/2024    ALKPHOS 120 04/09/2024      Likely due to hepatic steatosis.  Limit acetaminophen intake. No more than 4 gm in 24 hours.  Vodka cessation.  Referral placed to [facility] GI clinic to eval and treat.           Relevant Orders    Ambulatory referral/consult to Gastroenterology    Hepatic steatosis    Overview     4/26/2024  EXAMINATION:  US ABDOMEN LIMITED     CLINICAL HISTORY:  RUQ abdominal pain, elevated liver enzymes; Right upper quadrant pain     TECHNIQUE:  Limited ultrasound of the right upper quadrant of the abdomen was performed.     COMPARISON:  None     FINDINGS:  LIVER: 20 cm cranial caudal at the midclavicular line. Increased echogenicity with decreased penetration of the ultrasound beam. No focal mass appreciable. Portal vein is patent with appropriate directional flow.     PANCREAS: The visualized portions of pancreas appear normal.     GALLBLADDER: No shadowing calculi, wall thickening, or pericholecystic fluid.     BILE DUCTS: 4 mm common bile duct.  Distal common bile duct is obscured by shadowing bowel gas.     INFERIOR VENA CAVA: Normal in appearance.     RIGHT KIDNEY: 9.9 cm. No hydronephrosis.     OTHER: No ascites.     Impression:     Hepatomegaly with pronounced steatosis         Current Assessment & Plan     Please decrease the amount of saturated fats in your diet.    Choose a diet that emphasizes vegetables and avoids too much sugar and alcohol. Maintain a healthy weight: Those who are overweight or obese should reduce their daily calorie intake and exercise more.          Relevant Orders    Ambulatory referral/consult to Gastroenterology    Comprehensive Metabolic Panel    [id number] Mutation Analysis, Quantitative,  With Reflex to JAK2 Exon 12-15 Mutation Analysis    Hemochromatosis DNA Analysis (PCR)    Folate    Vitamin B12       Other    Well adult exam - Primary    Current Assessment & Plan     Wellness labs - 5/8/2024.  Cervical Cancer Screening - Last Pap on approximately a year ago with Dr. Cartwright . GYN appt: 5/24/2024.  Breast Cancer Screening - Last Mammogram approximately 1 year. Follow up annually. Ordered on 8/7/2023.  Colon Cancer Screening - No prior screening. Cologuard ordered (5/9/2024).  Osteoporosis Screening - Deferred due to age.   Eye Exam - Several years. List of local eye doctors provided to patient.  Dental Exam - Several years. List of local dentists provided to patient.  Vaccinations: Flu - Recommended annually, not currently being offered. / Pneumococcal - Declines / Shingles - Declines / Tetanus - Declines                Health Maintenance Due   Topic Date Due    Cervical Cancer Screening  Never done    Mammogram  Never done    Colorectal Cancer Screening  Never done    COVID-19 Vaccine (1 - 2023-24 season) Never done     Tests to Keep You Healthy    Mammogram: SCHEDULED  Colon Cancer Screening: ORDERED  Cervical Cancer Screening: DUE      Health Maintenance Topics with due status: Not Due       Topic Last Completion Date    Hemoglobin A1c (Prediabetes) 05/08/2024    Lipid Panel 05/08/2024    Influenza Vaccine Not Due       Future Appointments   Date Time Provider Department Center   5/9/2024  9:30 AM [facility] MAMMO2 [facility] MAMMO [place]   5/24/2024 10:10 AM Tomasa Brown, ANP [facility] GYN [place]   6/6/2024  7:00 AM Sanjana Wayne FNP [facility] INTMED [place]        Follow up in about 4 weeks (around 6/6/2024) for F2F, Follow up, Med check, No labs due, HTN, RTC PRN.          Signature:        CHERYL Resendiz  [facility]  [address] [id number]    Date of encounter: 5/9/24

## 2024-05-09 NOTE — ASSESSMENT & PLAN NOTE
Lab Results   Component Value Date    HGBA1C 6.0 05/08/2024     Medication initiation if HgbA1C becomes >6.5.  Avoid soda, simple sweets, and limit rice/pasta/bread/starches and consume brown options when possible.    Maintain healthy weight with BMI goal <30.    Perform aerobic exercise for 150 minutes per week (or 5 days a week for 30 minutes each day).

## 2024-05-09 NOTE — ASSESSMENT & PLAN NOTE
Lab Results   Component Value Date    FERRITIN 412.29 (H) 05/08/2024    Jak2, Hemochromatosis, folate, B12 ordered.  Will refer to [facility] hem/onc clinic if indicated.

## 2024-05-09 NOTE — PATIENT INSTRUCTIONS
REMINDER: Please complete labs within 1 week of appointment.   Please complete satisfaction survey when received. Thank you.    Ari Murillo,     If you are due for any health screening(s) below please notify me so we can arrange them to be ordered and scheduled. Most healthy patients at your age complete them, but you are free to accept or refuse.     If you can't do it, I'll definitely understand. If you can, I'd certainly appreciate it!    Tests to Keep You Healthy    Mammogram: ORDERED BUT NOT SCHEDULED  Colon Cancer Screening: ORDERED  Cervical Cancer Screening: DUE      Schedule your breast cancer screening today     Breast cancer is the second most common cancer in women,  and the second leading cause of death from cancer. Mammograms can detect breast cancer early, which significantly increases the chances of curing the cancer.       Our records indicate that you may be overdue for breast cancer screening. Cancer screenings save lives, so schedule yours today to stay healthy.     If you recently had a mammogram performed outside of [facility], please let your Health care team know so that they can update your health record.        Its time for your colon cancer screening     Colorectal cancer is one of the leading causes of cancer death for men and women but it doesnt have to be. Screenings can prevent colorectal cancer or find it early enough to treat and cure the disease.     Our records indicate that you may be overdue for colon cancer screening. A colonoscopy or stool screening test can help identify patients at risk for developing colon cancer. Cancer screenings save lives, so schedule yours today to stay healthy.     A colonoscopy is the preferred test for detecting colon cancer. It is needed only once every 10 years if results are negative. While you are sedated, a flexible, lighted tube with a tiny camera is inserted into the rectum and advanced through the colon to look for cancers.     An  alternative screening test that is used at home and returned to the lab may also be used. It detects hidden blood in bowel movements which could indicate cancer in the colon. If results are positive, you will need a colonoscopy to determine if the blood is a sign of cancer. This type of follow up (diagnostic) colonoscopy usually requires additional copays as required by your insurance provider.     If you recently had your colon cancer screening performed outside of [facility], please let your Health care team know so that they can update your health record. Please contact your PCP if you have any questions.    Your cervical cancer screening is due     Our records indicate that you may be overdue for your screening Pap smear. A Pap smear is an important health screening that can detect abnormal cells that can become cervical cancer. Cervical cancer screenings allow for early diagnosis and increase the likelihood of successful treatment.     The current recommendation for Pap smear screening is every 3-5 years for women at average risk. We encourage you to schedule your appointment with your [facility] provider. Many women see a gynecologist for this screening, but some primary care providers also provide Pap screening.     If you recently had your Pap smear screening performed outside of [facility], please let your health care team know so that they can update your health record.      Lets manage your high blood pressure     Your blood pressure was above 140/90 today during your visit. We recommend that you schedule a nurse visit in two weeks to check your blood pressure and discuss ways to support your health goals.     You can also manage your health and record your blood pressure from the comfort of home by keeping a daily blood pressure log. These results are shared with and reviewed by your provider. Please print this form (Daily Blood Pressure Log) to assist you in keeping track of  your blood pressure at home.     Schedule your nurse visit in two weeks to learn more about how to track and manage high blood pressure.    Daily Blood Pressure Log    Name:__________________________________                  Date of Birth:_________    Average Blood Pressure:  __________      Date: Time  (a.m.) Blood  Pressure: Pulse  Rate: Time  (p.m.) Blood  Pressure : Pulse  Rate:   Sample 8:37 127/83 84

## 2024-05-09 NOTE — ASSESSMENT & PLAN NOTE
Lab Results   Component Value Date    .00 (H) 05/08/2024       Lab Results   Component Value Date    TRIG 100 05/08/2024       Lab Results   Component Value Date    HDL 65 (H) 05/08/2024        Lab Results   Component Value Date    CHOL 229 (H) 05/08/2024      No statin due to transaminitis.  Rx zetia.  Follow a low cholesterol, low saturated fat diet with less than 200 mg of cholesterol a day.   Avoid fried foods and high saturated fats.  Add flax seed or fish oil supplements to diet.   Increase dietary fiber.   Regular exercise improves cholesterol levels.  Physical activity 5 times a week for 30 minutes per day (or 150 minutes per week).   Stressed importance of dietary modifications.

## 2024-05-10 LAB — BACTERIA UR CULT: NORMAL

## 2024-05-15 ENCOUNTER — PATIENT OUTREACH (OUTPATIENT)
Dept: ADMINISTRATIVE | Facility: OTHER | Age: 54
End: 2024-05-15
Payer: MEDICAID

## 2024-08-12 PROBLEM — Z00.00 WELL ADULT EXAM: Status: RESOLVED | Noted: 2023-01-04 | Resolved: 2024-08-12

## 2024-08-12 PROBLEM — N34.2 INFECTIVE URETHRITIS: Status: RESOLVED | Noted: 2024-05-09 | Resolved: 2024-08-12

## 2024-10-21 ENCOUNTER — OFFICE VISIT (OUTPATIENT)
Dept: GASTROENTEROLOGY | Facility: CLINIC | Age: 54
End: 2024-10-21
Payer: MEDICAID

## 2024-10-21 VITALS
OXYGEN SATURATION: 98 % | HEIGHT: 64 IN | DIASTOLIC BLOOD PRESSURE: 120 MMHG | SYSTOLIC BLOOD PRESSURE: 178 MMHG | WEIGHT: 196 LBS | TEMPERATURE: 98 F | RESPIRATION RATE: 16 BRPM | BODY MASS INDEX: 33.46 KG/M2 | HEART RATE: 72 BPM

## 2024-10-21 DIAGNOSIS — R74.01 TRANSAMINITIS: Primary | ICD-10-CM

## 2024-10-21 DIAGNOSIS — I10 PRIMARY HYPERTENSION: ICD-10-CM

## 2024-10-21 DIAGNOSIS — K76.0 HEPATIC STEATOSIS: ICD-10-CM

## 2024-10-21 DIAGNOSIS — Z12.11 SCREENING FOR COLON CANCER: ICD-10-CM

## 2024-10-21 PROCEDURE — 3066F NEPHROPATHY DOC TX: CPT | Mod: CPTII,,, | Performed by: NURSE PRACTITIONER

## 2024-10-21 PROCEDURE — 3080F DIAST BP >= 90 MM HG: CPT | Mod: CPTII,,, | Performed by: NURSE PRACTITIONER

## 2024-10-21 PROCEDURE — 1159F MED LIST DOCD IN RCRD: CPT | Mod: CPTII,,, | Performed by: NURSE PRACTITIONER

## 2024-10-21 PROCEDURE — 3044F HG A1C LEVEL LT 7.0%: CPT | Mod: CPTII,,, | Performed by: NURSE PRACTITIONER

## 2024-10-21 PROCEDURE — 3060F POS MICROALBUMINURIA REV: CPT | Mod: CPTII,,, | Performed by: NURSE PRACTITIONER

## 2024-10-21 PROCEDURE — 3008F BODY MASS INDEX DOCD: CPT | Mod: CPTII,,, | Performed by: NURSE PRACTITIONER

## 2024-10-21 PROCEDURE — 1160F RVW MEDS BY RX/DR IN RCRD: CPT | Mod: CPTII,,, | Performed by: NURSE PRACTITIONER

## 2024-10-21 PROCEDURE — 3077F SYST BP >= 140 MM HG: CPT | Mod: CPTII,,, | Performed by: NURSE PRACTITIONER

## 2024-10-21 PROCEDURE — 99204 OFFICE O/P NEW MOD 45 MIN: CPT | Mod: S$PBB,,, | Performed by: NURSE PRACTITIONER

## 2024-10-21 PROCEDURE — 99215 OFFICE O/P EST HI 40 MIN: CPT | Mod: PBBFAC | Performed by: NURSE PRACTITIONER

## 2024-10-21 NOTE — ASSESSMENT & PLAN NOTE
Per review of laboratory results, she has a longstanding history of elevated LFTs (ALT>AST).    Acute viral hepatitis panel was negative December 20, 2022.    Abdominal ultrasound April 26, 2024 revealed hepatomegaly with pronounced steatosis.  Lifestyle and dietary modifications provided  Recommend good control of comorbidities  Will proceed with further workup for elevated LFTs (see below)  Call with updates  F/u clinic visit with NP in 6 months

## 2024-10-21 NOTE — ASSESSMENT & PLAN NOTE
Asymptomatic at this time  Advised patient to take her prescription blood pressure medication when she gets home and to recheck blood pressure at home within 2 hours of taking the medication; advised that she send us a portal message regarding blood pressure reading and she verbalized understanding.  ER precautions provided   Will contact PCP to notify her blood pressure readings as well; scheduled to see PCP November 4, 2024.

## 2024-10-21 NOTE — PROGRESS NOTES
Subjective:       Patient ID: Lidia Aguirre is a 54 y.o. female.    Chief Complaint: ABN Ultrasound (US showed fatty liver and Gallbladder wall thickening. She has some RUQ pain after eating bad foods. )    This 54-year-old female with asthma is referred for transaminitis.  She presents unaccompanied.  Per review of laboratory results, she has a longstanding history of elevated LFTs (ALT>AST).  Acute viral hepatitis panel was negative 2022.  Abdominal ultrasound 2024 revealed hepatomegaly with pronounced steatosis.  She reports feeling well and denies nocturnal symptoms, appetite changes, unintentional weight loss, fever, chills, nausea, vomiting, hematemesis, odynophagia, dysphagia, acid reflux, pyrosis, belching, bloating, early satiety, or abdominal pain.  Bowel habits are described as formed stools once daily without melena, hematochezia, fecal urgency, fecal incontinence, or pain with defecation.    Blood pressure currently elevated at 189/114 mmHg.  She reports not taking any of her prescription medications prior to arrival to office visit this morning.  She has been taking Delsym for cough over the past week and did take Delsym this morning.  She denies headaches, vision changes, lightheadedness, syncope, dizziness, weakness, fatigue, chest pain, pallor, diaphoresis, dyspnea, or palpitations.  She reports blood pressure checks at home have been normal.    She denies ever having an EGD or colonoscopy done. She denies regular NSAID use or use of blood thinners. She denies tobacco or alcohol use. She denies illicit drug use. She denies a family history of IBD, colon polyps, or colon cancer.    Review of patient's allergies indicates:  No Known Allergies    Past Medical History:   Diagnosis Date    Allergy     Asthma     Carpal tunnel syndrome      Past Surgical History:   Procedure Laterality Date    APPENDECTOMY       SECTION       Family History:   family history includes  Depression in her father; Heart disease in her mother; Hypertension in her mother; Lupus in her mother and sister.    Social History:    reports that she has never smoked. She has never used smokeless tobacco. She reports current alcohol use. She reports that she does not currently use drugs.    Review of Systems  Negative except as noted in the HPI.      Objective:      Physical Exam  Constitutional:       Appearance: Normal appearance.   HENT:      Head: Normocephalic.      Mouth/Throat:      Mouth: Mucous membranes are moist.   Eyes:      Extraocular Movements: Extraocular movements intact.      Conjunctiva/sclera: Conjunctivae normal.      Pupils: Pupils are equal, round, and reactive to light.   Cardiovascular:      Rate and Rhythm: Normal rate and regular rhythm.      Pulses: Normal pulses.      Heart sounds: Normal heart sounds.   Pulmonary:      Effort: Pulmonary effort is normal.      Breath sounds: Normal breath sounds.   Abdominal:      General: Bowel sounds are normal.      Palpations: Abdomen is soft.   Musculoskeletal:         General: Normal range of motion.      Cervical back: Normal range of motion and neck supple.   Skin:     General: Skin is warm and dry.   Neurological:      General: No focal deficit present.      Mental Status: She is alert and oriented to person, place, and time.   Psychiatric:         Mood and Affect: Mood normal.         Behavior: Behavior normal.         Thought Content: Thought content normal.         Judgment: Judgment normal.         Home Medications:     Current Outpatient Medications   Medication Sig    amLODIPine (NORVASC) 5 MG tablet Take 1 tablet (5 mg total) by mouth once daily.    blood pressure monitor Kit 1 each by Misc.(Non-Drug; Combo Route) route 2 (two) times a day.    cholecalciferol, vitamin D3, (VITAMIN D3) 50 mcg (2,000 unit) Cap capsule Take 1 capsule (2,000 Units total) by mouth Daily.    ezetimibe (ZETIA) 10 mg tablet Take 1 tablet (10 mg total) by  mouth once daily.     No current facility-administered medications for this visit.     Laboratory Results:     Recent Results (from the past 24 weeks)   Vitamin D    Collection Time: 05/08/24  2:08 PM   Result Value Ref Range    Vitamin D 21 (L) 30 - 80 ng/mL   Urinalysis, Reflex to Urine Culture    Collection Time: 05/08/24  2:08 PM    Specimen: Urine   Result Value Ref Range    Color, UA Yellow Yellow, Light-Yellow, Dark Yellow, Selena, Straw    Appearance, UA SL CLOUDY (A) Clear    Specific Gravity, UA >=1.030 1.005 - 1.030    pH, UA 6.0 5.0 - 8.5    Protein, UA Negative Negative    Glucose, UA Negative Negative, Normal    Ketones, UA 15 (A) Negative    Blood, UA Trace (A) Negative    Bilirubin, UA Negative Negative    Urobilinogen, UA 0.2 0.2, 1.0, Normal    Nitrites, UA Negative Negative    Leukocyte Esterase, UA Trace (A) Negative   TSH    Collection Time: 05/08/24  2:08 PM   Result Value Ref Range    TSH 1.387 0.350 - 4.940 uIU/mL   T4, Free    Collection Time: 05/08/24  2:08 PM   Result Value Ref Range    Thyroxine Free 0.97 0.70 - 1.48 ng/dL   Microalbumin/Creatinine Ratio, Urine    Collection Time: 05/08/24  2:08 PM   Result Value Ref Range    Urine Microalbumin 43.4 (H) <=30.0 ug/ml    Urine Creatinine 136.3 (H) 45.0 - 106.0 mg/dL    Microalbumin Creatinine Ratio 31.8 (H) 0.0 - 30.0 mg/gm Cr   Lipid Panel    Collection Time: 05/08/24  2:08 PM   Result Value Ref Range    Cholesterol Total 229 (H) <=200 mg/dL    HDL Cholesterol 65 (H) 35 - 60 mg/dL    Triglyceride 100 37 - 140 mg/dL    Cholesterol/HDL Ratio 4 0 - 5    Very Low Density Lipoprotein 20     LDL Cholesterol 144.00 (H) 50.00 - 140.00 mg/dL   Iron and TIBC    Collection Time: 05/08/24  2:08 PM   Result Value Ref Range    Iron Binding Capacity Unsaturated 194 70 - 310 ug/dL    Iron Level 139 50 - 170 ug/dL    Transferrin 294 180 - 382 mg/dL    Iron Binding Capacity Total 333 250 - 450 ug/dL    Iron Saturation 42 20 - 50 %   Hemoglobin A1C     Collection Time: 05/08/24  2:08 PM   Result Value Ref Range    Hemoglobin A1c 6.0 <=7.0 %    Estimated Average Glucose 125.5 mg/dL   Ferritin    Collection Time: 05/08/24  2:08 PM   Result Value Ref Range    Ferritin Level 412.29 (H) 4.63 - 204.00 ng/mL   Comprehensive Metabolic Panel    Collection Time: 05/08/24  2:08 PM   Result Value Ref Range    Sodium 140 136 - 145 mmol/L    Potassium 4.2 3.5 - 5.1 mmol/L    Chloride 105 98 - 107 mmol/L    CO2 24 22 - 29 mmol/L    Glucose 86 74 - 100 mg/dL    Blood Urea Nitrogen 12.8 9.8 - 20.1 mg/dL    Creatinine 0.77 0.55 - 1.02 mg/dL    Calcium 9.8 8.4 - 10.2 mg/dL    Protein Total 8.5 (H) 6.4 - 8.3 gm/dL    Albumin 4.2 3.5 - 5.0 g/dL    Globulin 4.3 (H) 2.4 - 3.5 gm/dL    Albumin/Globulin Ratio 1.0 (L) 1.1 - 2.0 ratio    Bilirubin Total 0.7 <=1.5 mg/dL     40 - 150 unit/L    ALT 85 (H) 0 - 55 unit/L    AST 62 (H) 5 - 34 unit/L    eGFR >60 mL/min/1.73/m2   CBC with Differential    Collection Time: 05/08/24  2:08 PM   Result Value Ref Range    WBC 7.78 4.50 - 11.50 x10(3)/mcL    RBC 4.43 4.20 - 5.40 x10(6)/mcL    Hgb 14.2 12.0 - 16.0 g/dL    Hct 42.0 37.0 - 47.0 %    MCV 94.8 (H) 80.0 - 94.0 fL    MCH 32.1 (H) 27.0 - 31.0 pg    MCHC 33.8 33.0 - 36.0 g/dL    RDW 12.5 11.5 - 17.0 %    Platelet 244 130 - 400 x10(3)/mcL    MPV 9.1 7.4 - 10.4 fL    Neut % 71.9 %    Lymph % 20.1 %    Mono % 6.4 %    Eos % 0.9 %    Basophil % 0.4 %    Lymph # 1.56 0.6 - 4.6 x10(3)/mcL    Neut # 5.60 2.1 - 9.2 x10(3)/mcL    Mono # 0.50 0.1 - 1.3 x10(3)/mcL    Eos # 0.07 0 - 0.9 x10(3)/mcL    Baso # 0.03 <=0.2 x10(3)/mcL    IG# 0.02 0 - 0.04 x10(3)/mcL    IG% 0.3 %    NRBC% 0.0 %   Urinalysis, Microscopic    Collection Time: 05/08/24  2:08 PM   Result Value Ref Range    Bacteria, UA None Seen None Seen, Rare, Occasional /HPF    RBC, UA None Seen None Seen, 0-2, 3-5, 0-5 /HPF    WBC, UA 11-20 (A) None Seen, 0-2, 3-5, 0-5 /HPF    Squamous Epithelial Cells, UA Moderate (A) None Seen, Rare,  Occasional, Occ /HPF   Urine culture    Collection Time: 05/08/24  2:08 PM    Specimen: Urine   Result Value Ref Range    Urine Culture No Significant Growth      Imaging Results:     Narrative & Impression  EXAMINATION:  US ABDOMEN LIMITED     CLINICAL HISTORY:  RUQ abdominal pain, elevated liver enzymes; Right upper quadrant pain     TECHNIQUE:  Limited ultrasound of the right upper quadrant of the abdomen was performed.     COMPARISON:  None     FINDINGS:  LIVER: 20 cm cranial caudal at the midclavicular line. Increased echogenicity with decreased penetration of the ultrasound beam. No focal mass appreciable. Portal vein is patent with appropriate directional flow.     PANCREAS: The visualized portions of pancreas appear normal.     GALLBLADDER: No shadowing calculi, wall thickening, or pericholecystic fluid.     BILE DUCTS: 4 mm common bile duct.  Distal common bile duct is obscured by shadowing bowel gas.     INFERIOR VENA CAVA: Normal in appearance.     RIGHT KIDNEY: 9.9 cm. No hydronephrosis.     OTHER: No ascites.     Impression:     Hepatomegaly with pronounced steatosis.      Electronically signed by:Meghana Duffy  Date:                                            04/26/2024  Time:                                           13:02    Assessment/Plan:     Problem List Items Addressed This Visit          Cardiac/Vascular    Primary hypertension     Asymptomatic at this time  Advised patient to take her prescription blood pressure medication when she gets home and to recheck blood pressure at home within 2 hours of taking the medication; advised that she send us a portal message regarding blood pressure reading and she verbalized understanding.  ER precautions provided   Will contact PCP to notify her blood pressure readings as well; scheduled to see PCP November 4, 2024.            GI    Transaminitis - Primary     Per review of laboratory results, she has a longstanding history of elevated LFTs (ALT>AST).     Acute viral hepatitis panel was negative December 20, 2022.    Abdominal ultrasound April 26, 2024 revealed hepatomegaly with pronounced steatosis.  Lifestyle and dietary modifications provided  Recommend good control of comorbidities  Will proceed with further workup for elevated LFTs (see below)  Call with updates  F/u clinic visit with NP in 6 months         Relevant Orders    Actin (Smooth Muscle) Antibody (IgG)    Alpha-1-Antitrypsin    Anti-Liver, Kidney, Microsome Ab    Antimitochondrial Antibody    ANTINUCLEAR ANTIBODIES    APTT    CBC Auto Differential    Ceruloplasmin    Comprehensive Metabolic Panel    Ferritin    US Abdomen Limited    Tissue Transglutaminase, IgA    Protime-INR    Iron and TIBC    Hepatitis C Antibody    Hepatitis B Surface Antigen    Hepatitis B Core Antibody, IgM    Hepatitis A Antibody, IgM    US Elastography Liver w/imaging    Hepatic steatosis     See above         Relevant Orders    Actin (Smooth Muscle) Antibody (IgG)    Alpha-1-Antitrypsin    Anti-Liver, Kidney, Microsome Ab    Antimitochondrial Antibody    ANTINUCLEAR ANTIBODIES    APTT    CBC Auto Differential    Ceruloplasmin    Comprehensive Metabolic Panel    Ferritin    US Abdomen Limited    Tissue Transglutaminase, IgA    Protime-INR    Iron and TIBC    Hepatitis C Antibody    Hepatitis B Surface Antigen    Hepatitis B Core Antibody, IgM    Hepatitis A Antibody, IgM    US Elastography Liver w/imaging    Screening for colon cancer     Cologuard ordered         Relevant Orders    Cologuard Screening (Multitarget Stool DNA)

## 2024-10-21 NOTE — LETTER
October 21, 2024      [facility]  [address] [facility] [address]  Phone: [phone]       Patient: Lidia Aguirre   YOB: 1970  Date of Visit: 10/21/2024    To Whom It May Concern:    Taryn Aguirre  was at [facility] on 10/21/2024. The patient may return to work/school on 10/22/2024 with no restrictions. If you have any questions or concerns, or if I can be of further assistance, please do not hesitate to contact me.    Sincerely,    Marnie Vaca NP

## 2024-10-25 ENCOUNTER — LAB VISIT (OUTPATIENT)
Dept: LAB | Facility: HOSPITAL | Age: 54
End: 2024-10-25
Attending: NURSE PRACTITIONER
Payer: MEDICAID

## 2024-10-25 DIAGNOSIS — K76.0 HEPATIC STEATOSIS: ICD-10-CM

## 2024-10-25 DIAGNOSIS — R74.01 TRANSAMINITIS: ICD-10-CM

## 2024-10-25 LAB
ALBUMIN SERPL-MCNC: 4.1 G/DL (ref 3.5–5)
ALBUMIN/GLOB SERPL: 0.9 RATIO (ref 1.1–2)
ALP SERPL-CCNC: 122 UNIT/L (ref 40–150)
ALT SERPL-CCNC: 64 UNIT/L (ref 0–55)
ANION GAP SERPL CALC-SCNC: 7 MEQ/L
APTT PPP: 32.8 SECONDS (ref 23.2–33.7)
AST SERPL-CCNC: 51 UNIT/L (ref 5–34)
BASOPHILS # BLD AUTO: 0.04 X10(3)/MCL
BASOPHILS NFR BLD AUTO: 0.6 %
BILIRUB SERPL-MCNC: 0.8 MG/DL
BUN SERPL-MCNC: 12.2 MG/DL (ref 9.8–20.1)
CALCIUM SERPL-MCNC: 9.6 MG/DL (ref 8.4–10.2)
CHLORIDE SERPL-SCNC: 108 MMOL/L (ref 98–107)
CO2 SERPL-SCNC: 24 MMOL/L (ref 22–29)
CREAT SERPL-MCNC: 0.75 MG/DL (ref 0.55–1.02)
CREAT/UREA NIT SERPL: 16
EOSINOPHIL # BLD AUTO: 0.09 X10(3)/MCL (ref 0–0.9)
EOSINOPHIL NFR BLD AUTO: 1.4 %
ERYTHROCYTE [DISTWIDTH] IN BLOOD BY AUTOMATED COUNT: 12.3 % (ref 11.5–17)
FERRITIN SERPL-MCNC: 375.91 NG/ML (ref 4.63–204)
GFR SERPLBLD CREATININE-BSD FMLA CKD-EPI: >60 ML/MIN/1.73/M2
GLOBULIN SER-MCNC: 4.6 GM/DL (ref 2.4–3.5)
GLUCOSE SERPL-MCNC: 104 MG/DL (ref 74–100)
HAV IGM SERPL QL IA: NONREACTIVE
HBV CORE IGM SERPL QL IA: NONREACTIVE
HBV SURFACE AG SERPL QL IA: NONREACTIVE
HCT VFR BLD AUTO: 42.6 % (ref 37–47)
HCV AB SERPL QL IA: NONREACTIVE
HGB BLD-MCNC: 14.4 G/DL (ref 12–16)
IMM GRANULOCYTES # BLD AUTO: 0.03 X10(3)/MCL (ref 0–0.04)
IMM GRANULOCYTES NFR BLD AUTO: 0.5 %
INR PPP: 1.1
IRON SATN MFR SERPL: 41 % (ref 20–50)
IRON SERPL-MCNC: 141 UG/DL (ref 50–170)
LYMPHOCYTES # BLD AUTO: 1.68 X10(3)/MCL (ref 0.6–4.6)
LYMPHOCYTES NFR BLD AUTO: 26.1 %
MCH RBC QN AUTO: 32.7 PG (ref 27–31)
MCHC RBC AUTO-ENTMCNC: 33.8 G/DL (ref 33–36)
MCV RBC AUTO: 96.8 FL (ref 80–94)
MONOCYTES # BLD AUTO: 0.51 X10(3)/MCL (ref 0.1–1.3)
MONOCYTES NFR BLD AUTO: 7.9 %
NEUTROPHILS # BLD AUTO: 4.09 X10(3)/MCL (ref 2.1–9.2)
NEUTROPHILS NFR BLD AUTO: 63.5 %
NRBC BLD AUTO-RTO: 0 %
PLATELET # BLD AUTO: 254 X10(3)/MCL (ref 130–400)
PMV BLD AUTO: 9.2 FL (ref 7.4–10.4)
POTASSIUM SERPL-SCNC: 4.5 MMOL/L (ref 3.5–5.1)
PROT SERPL-MCNC: 8.7 GM/DL (ref 6.4–8.3)
PROTHROMBIN TIME: 14 SECONDS (ref 11.4–14)
RBC # BLD AUTO: 4.4 X10(6)/MCL (ref 4.2–5.4)
SODIUM SERPL-SCNC: 139 MMOL/L (ref 136–145)
TIBC SERPL-MCNC: 205 UG/DL (ref 70–310)
TIBC SERPL-MCNC: 346 UG/DL (ref 250–450)
TRANSFERRIN SERPL-MCNC: 316 MG/DL (ref 180–382)
WBC # BLD AUTO: 6.44 X10(3)/MCL (ref 4.5–11.5)

## 2024-10-25 PROCEDURE — 85730 THROMBOPLASTIN TIME PARTIAL: CPT

## 2024-10-25 PROCEDURE — 85025 COMPLETE CBC W/AUTO DIFF WBC: CPT

## 2024-10-25 PROCEDURE — 86364 TISS TRNSGLTMNASE EA IG CLAS: CPT

## 2024-10-25 PROCEDURE — 36415 COLL VENOUS BLD VENIPUNCTURE: CPT

## 2024-10-25 PROCEDURE — 86381 MITOCHONDRIAL ANTIBODY EACH: CPT

## 2024-10-25 PROCEDURE — 86376 MICROSOMAL ANTIBODY EACH: CPT

## 2024-10-25 PROCEDURE — 80053 COMPREHEN METABOLIC PANEL: CPT

## 2024-10-25 PROCEDURE — 85610 PROTHROMBIN TIME: CPT

## 2024-10-25 PROCEDURE — 87340 HEPATITIS B SURFACE AG IA: CPT

## 2024-10-25 PROCEDURE — 83516 IMMUNOASSAY NONANTIBODY: CPT

## 2024-10-25 PROCEDURE — 86705 HEP B CORE ANTIBODY IGM: CPT

## 2024-10-25 PROCEDURE — 82390 ASSAY OF CERULOPLASMIN: CPT

## 2024-10-25 PROCEDURE — 82728 ASSAY OF FERRITIN: CPT

## 2024-10-25 PROCEDURE — 83540 ASSAY OF IRON: CPT

## 2024-10-25 PROCEDURE — 86803 HEPATITIS C AB TEST: CPT

## 2024-10-25 PROCEDURE — 83550 IRON BINDING TEST: CPT

## 2024-10-25 PROCEDURE — 86039 ANTINUCLEAR ANTIBODIES (ANA): CPT

## 2024-10-25 PROCEDURE — 82103 ALPHA-1-ANTITRYPSIN TOTAL: CPT

## 2024-10-25 PROCEDURE — 86709 HEPATITIS A IGM ANTIBODY: CPT

## 2024-10-26 LAB
MITOCHONDRIA M2 AB SER IA-ACNC: <0.1 U
TTG IGA SER IA-ACNC: <1.02 FLU

## 2024-10-27 LAB
CERULOPLASMIN SERPL-MCNC: 32.3 MG/DL (ref 20–51)
LKM-1 IGG SER IA-ACNC: 1.1 U

## 2024-10-28 LAB
A1AT SERPL NEPH-MCNC: 140 MG/DL (ref 100–190)
ANTINUCLEAR ANTIBODY SCREEN (OHS): NEGATIVE
DSDNA AB QUANT (OHS): 3.4 IU/ML

## 2024-10-29 DIAGNOSIS — R79.89 ELEVATED FERRITIN LEVEL: Primary | ICD-10-CM

## 2024-10-29 LAB — SMA IGG SER-ACNC: 9.5 U

## 2024-11-20 ENCOUNTER — TELEPHONE (OUTPATIENT)
Dept: GASTROENTEROLOGY | Facility: CLINIC | Age: 54
End: 2024-11-20

## 2024-11-20 ENCOUNTER — PROCEDURE VISIT (OUTPATIENT)
Dept: GASTROENTEROLOGY | Facility: CLINIC | Age: 54
End: 2024-11-20
Payer: MEDICAID

## 2024-11-20 DIAGNOSIS — K76.0 HEPATIC STEATOSIS: ICD-10-CM

## 2024-11-20 DIAGNOSIS — R74.01 TRANSAMINITIS: ICD-10-CM

## 2024-11-20 PROCEDURE — 91200 LIVER ELASTOGRAPHY: CPT | Mod: PBBFAC | Performed by: NURSE PRACTITIONER

## 2024-11-20 PROCEDURE — 91200 LIVER ELASTOGRAPHY: CPT | Mod: 26,S$PBB,, | Performed by: NURSE PRACTITIONER

## 2024-11-20 NOTE — PROGRESS NOTES
Patient here for FibroScan visit. Reports NPO X3 hours and denies any implanted electronic devices. Position patient for the procedure to expose the right rib cage. Explained procedure to the patient. FibroScan exam complete and was tolerated without any problems.

## 2024-11-20 NOTE — TELEPHONE ENCOUNTER
----- Message from CHERYL Gordon sent at 11/20/2024  3:27 PM CST -----  Please notify FibroScan with F3, S0 with plan for repeat in 6 months.  Thanks

## 2024-11-20 NOTE — PROCEDURES
Fibroscan Procedure     Name: Lidia Aguirre  Date of Procedure : 2024  Interpreting Physician: Marnie Vaca NP  Diagnosis: MASLD (NAFLD)    Probe: M    Fibroscan reading: 10.1 kPa    Fibrosis: F3     CAP readin dB/m    Steatosis: S0      Miscellaneous:       Repeat: 6 months

## 2025-04-11 ENCOUNTER — HOSPITAL ENCOUNTER (EMERGENCY)
Facility: HOSPITAL | Age: 55
Discharge: HOME | End: 2025-04-11
Attending: EMERGENCY MEDICINE

## 2025-04-11 VITALS
TEMPERATURE: 98 F | OXYGEN SATURATION: 97 % | WEIGHT: 207.63 LBS | HEART RATE: 75 BPM | RESPIRATION RATE: 20 BRPM | BODY MASS INDEX: 35.63 KG/M2 | SYSTOLIC BLOOD PRESSURE: 154 MMHG | DIASTOLIC BLOOD PRESSURE: 95 MMHG

## 2025-04-11 DIAGNOSIS — R11.2 NAUSEA AND VOMITING, UNSPECIFIED VOMITING TYPE: Primary | ICD-10-CM

## 2025-04-11 DIAGNOSIS — I10 HYPERTENSION, UNSPECIFIED TYPE: ICD-10-CM

## 2025-04-11 DIAGNOSIS — K52.9 GASTROENTERITIS: ICD-10-CM

## 2025-04-11 PROCEDURE — 99284 EMERGENCY DEPT VISIT MOD MDM: CPT

## 2025-04-11 PROCEDURE — 25000003 PHARM REV CODE 250: Performed by: EMERGENCY MEDICINE

## 2025-04-11 RX ORDER — ONDANSETRON 4 MG/1
4 TABLET, ORALLY DISINTEGRATING ORAL EVERY 8 HOURS PRN
Qty: 12 TABLET | Refills: 1 | Status: SHIPPED | OUTPATIENT
Start: 2025-04-11

## 2025-04-11 RX ORDER — ONDANSETRON 4 MG/1
4 TABLET, ORALLY DISINTEGRATING ORAL
Status: COMPLETED | OUTPATIENT
Start: 2025-04-11 | End: 2025-04-11

## 2025-04-11 RX ORDER — AMLODIPINE BESYLATE 5 MG/1
5 TABLET ORAL DAILY
Qty: 30 TABLET | Refills: 3 | Status: SHIPPED | OUTPATIENT
Start: 2025-04-11

## 2025-04-11 RX ORDER — AMLODIPINE BESYLATE 5 MG/1
5 TABLET ORAL
Status: COMPLETED | OUTPATIENT
Start: 2025-04-11 | End: 2025-04-11

## 2025-04-11 RX ADMIN — AMLODIPINE BESYLATE 5 MG: 5 TABLET ORAL at 08:04

## 2025-04-11 RX ADMIN — ONDANSETRON 4 MG: 4 TABLET, ORALLY DISINTEGRATING ORAL at 08:04

## 2025-04-11 NOTE — Clinical Note
"Lidia Howekrzysztof Aguirre was seen and treated in our emergency department on 4/11/2025.  She may return to work on 04/14/2025.       If you have any questions or concerns, please don't hesitate to call.      John Gonzalez MD"

## 2025-04-11 NOTE — ED PROVIDER NOTES
Encounter Date: 2025       History     Chief Complaint   Patient presents with    Cough    Vomiting     Cough, vomiting and excessive mucous production x2 days. Hypertensive in triage    Hypertension     Underlying history of asthma, allergies, carpal tunnel syndrome, incidental surgeries, and hypertension.  Recently on amlodipine which she was taking with apparent good results but ran out and lost her insurance, would like a referral into our clinic and renewal of this medication.  She has been working 10 days in a row and when she went to work today her symptoms are bad enough that she felt she had to leave and will need a note for taking time off and returning.  Symptoms have included nausea, vomiting, cough, and postnasal drip for the last 2 days.  Blood pressure noted elevated on arrival, repeat 198/117.  No fever, chills, sweats, diarrhea, abdominal pain, chest pain, back pain, or dyspnea.  With recent vomiting over the last 2 or 3 days she some redness in the right side of her right eye, painless.  No visual change.  No other complaints.    The history is provided by the patient. No  was used.     Review of patient's allergies indicates:  No Known Allergies  Past Medical History:   Diagnosis Date    Allergy     Asthma     Carpal tunnel syndrome      Past Surgical History:   Procedure Laterality Date    APPENDECTOMY       SECTION       Family History   Problem Relation Name Age of Onset    Hypertension Mother      Heart disease Mother      Lupus Mother      Depression Father      Lupus Sister       Social History[1]  Review of Systems   Eyes:  Positive for redness.   Respiratory:  Positive for cough.    Gastrointestinal:  Positive for nausea and vomiting.       Physical Exam     Initial Vitals   BP Pulse Resp Temp SpO2   25 0750 25 0747 25 0747 25 0747 25 0747   (!) 161/130 92 20 97.9 °F (36.6 °C) 96 %      MAP       --                Physical  Exam    Nursing note and vitals reviewed.  Constitutional: She appears well-developed and well-nourished. She is not diaphoretic. No distress.   HENT:   Head: Normocephalic and atraumatic. Mouth/Throat: No oropharyngeal exudate.   Eyes: EOM are normal. Pupils are equal, round, and reactive to light. Right eye exhibits no discharge. Left eye exhibits no discharge. No scleral icterus.   Mild-to-moderate right subconjunctival hemorrhage, outer lateral and upper aspect.  No other findings.   Neck: Neck supple. No thyromegaly present. No tracheal deviation present. No JVD present.   Normal range of motion.  Cardiovascular:  Normal rate, regular rhythm, normal heart sounds and intact distal pulses.     Exam reveals no gallop and no friction rub.       No murmur heard.  Pulmonary/Chest: Breath sounds normal. No stridor. No respiratory distress. She has no wheezes. She has no rhonchi. She has no rales. She exhibits no tenderness.   Abdominal: Abdomen is soft. Bowel sounds are normal. She exhibits no distension and no mass. There is no abdominal tenderness. There is no rebound and no guarding.   Musculoskeletal:         General: No tenderness or edema. Normal range of motion.      Cervical back: Normal range of motion and neck supple.     Neurological: She is alert and oriented to person, place, and time. She has normal strength.   Skin: Skin is warm and dry. No rash and no abscess noted. No erythema.   Psychiatric: She has a normal mood and affect. Her behavior is normal. Judgment and thought content normal.         ED Course   Procedures  Labs Reviewed - No data to display       Imaging Results    None          Medications   ondansetron disintegrating tablet 4 mg (has no administration in time range)   amLODIPine tablet 5 mg (has no administration in time range)       8:15 AM D/C instructions:        Rest, plenty of fluids, nausea medicine as prescribed, resume blood pressure medicine as prescribed.      I have referred  "you to our Internal Medicine Clinic, they will call you to schedule an appointment to get established with 1 of our primary care providers.      Return as needed or if worse.      Medical Decision Making  Left work and needs a note for nausea and vomiting associated with minor exposure to similar ill persons, has been working many days in a row.  Also untreated hypertension, recently out amlodipine.  Appropriate for routine outpatient management as outlined.    Problems Addressed:  Gastroenteritis: acute illness or injury  Hypertension, unspecified type: chronic illness or injury with exacerbation, progression, or side effects of treatment    Risk  Prescription drug management.      Additional MDM:   Differential Diagnosis:   Viral gastroenteritis, other causes of nausea and vomiting, untreated hypertension among others                                    Clinical Impression:  Final diagnoses:  [R11.2] Nausea and vomiting, unspecified vomiting type (Primary)  [K52.9] Gastroenteritis  [I10] Hypertension, unspecified type          ED Disposition Condition    Discharge Stable          ED Prescriptions       Medication Sig Dispense Start Date End Date Auth. Provider    ondansetron (ZOFRAN-ODT) 4 MG TbDL Take 1 tablet (4 mg total) by mouth every 8 (eight) hours as needed (n/v). 12 tablet 4/11/2025 -- John Gonzalez MD    amLODIPine (NORVASC) 5 MG tablet Take 1 tablet (5 mg total) by mouth once daily. 30 tablet 4/11/2025 -- John Gonzalez MD          Follow-up Information    None              [1]   Social History  Tobacco Use    Smoking status: Never    Smokeless tobacco: Never   Substance Use Topics    Alcohol use: Yes     Comment: occl"    Drug use: Not Currently        John Gonzalez MD  04/11/25 0818    "

## 2025-04-11 NOTE — DISCHARGE INSTRUCTIONS
Rest, plenty of fluids, nausea medicine as prescribed, resume blood pressure medicine as prescribed.      I have referred you to our Internal Medicine Clinic, they will call you to schedule an appointment to get established with 1 of our primary care providers.      Return as needed or if worse.

## 2025-04-29 ENCOUNTER — TELEPHONE (OUTPATIENT)
Dept: GASTROENTEROLOGY | Facility: CLINIC | Age: 55
End: 2025-04-29

## 2025-04-29 NOTE — TELEPHONE ENCOUNTER
Please let me know date of patient reschedule once she is contacted for follow up. No show 4/25/25. Thanks